# Patient Record
Sex: MALE | Race: WHITE
[De-identification: names, ages, dates, MRNs, and addresses within clinical notes are randomized per-mention and may not be internally consistent; named-entity substitution may affect disease eponyms.]

---

## 2018-01-02 ENCOUNTER — HOSPITAL ENCOUNTER (EMERGENCY)
Dept: HOSPITAL 47 - EC | Age: 63
Discharge: HOME | End: 2018-01-02
Payer: COMMERCIAL

## 2018-01-02 DIAGNOSIS — M10.9: ICD-10-CM

## 2018-01-02 DIAGNOSIS — M51.34: Primary | ICD-10-CM

## 2018-01-02 DIAGNOSIS — E07.9: ICD-10-CM

## 2018-01-02 DIAGNOSIS — E78.5: ICD-10-CM

## 2018-01-02 DIAGNOSIS — M48.061: ICD-10-CM

## 2018-01-02 DIAGNOSIS — Z79.899: ICD-10-CM

## 2018-01-02 DIAGNOSIS — M51.16: ICD-10-CM

## 2018-01-02 DIAGNOSIS — M47.814: ICD-10-CM

## 2018-01-02 DIAGNOSIS — I10: ICD-10-CM

## 2018-01-02 PROCEDURE — 99284 EMERGENCY DEPT VISIT MOD MDM: CPT

## 2018-01-02 PROCEDURE — 72131 CT LUMBAR SPINE W/O DYE: CPT

## 2018-01-02 PROCEDURE — 96372 THER/PROPH/DIAG INJ SC/IM: CPT

## 2018-01-02 PROCEDURE — 72128 CT CHEST SPINE W/O DYE: CPT

## 2018-01-02 PROCEDURE — 72192 CT PELVIS W/O DYE: CPT

## 2018-01-02 NOTE — CT
EXAMINATION TYPE: CT pelvis wo con

 

DATE OF EXAM: 1/2/2018

 

COMPARISON: NONE

 

HISTORY: Patient fell through wooden and landed on bottom, 2 weeks ago.

 

CT DLP: 1226.9 mGycm

Automated exposure control for dose reduction was used. Unenhanced CT of the pelvis was performed wit
h bone and soft tissue window settings submitted.

 

FINDINGS: 

No evidence for fracture or dislocation. Mild degenerative hip  narrowing. No evidence for soft tissu
e mass. Small fat-containing right inguinal hernia. No unusual collections or masses.

 

IMPRESSION: 

NO PELVIC FRACTURES SEEN.

## 2018-01-02 NOTE — CT
EXAMINATION TYPE: CT lumbar spine wo con

 

DATE OF EXAM: 1/2/2018

 

COMPARISON: NONE

 

HISTORY: Patient fell through wooden and landed on bottom, 2 weeks ago.

 

CT DLP: 1943.62 mGycm

 

Unenhanced CT of the lumbar spine was performed.  Bone and soft tissue window settings are submitted 
as well as coronal and sagittal reconstructions. 

 

Spinal canal is congenitally diminutive in size.

 

L1-L2: Normal disc space height.  No disc herniation protrusion or central stenosis.  No facet joint 
arthropathy.  No evidence for foraminal encroachment.  

 

L2-L3: Normal disc space height.  No disc herniation protrusion or central stenosis.  No facet joint 
arthropathy.  No evidence for foraminal encroachment.  

 

L3-L4: Mild degenerative disc space narrowing. Circumferential disc bulge effaces the ventral thecal 
sac. Mild central stenosis suggested.  

 

L4-L5: Left paracentral disc herniation resulting in left lateral recess stenosis and central stenosi
s. Left foraminal encroachment identified.  

 

L5-S1: Normal disc space height.  No disc herniation protrusion or central stenosis.  No facet joint 
arthropathy.  No evidence for foraminal encroachment.  

 

No paraspinal masses are identified.  Lumbar segments are free if fracture. Atrophic changes left kid
glenys.

 

IMPRESSION:

 

1. No evidence for fracture.

2. Left paracentral disc herniation at L4-5 resulting in central stenosis and left lateral recess dane
nosis.

3. Disc bulging with borderline to mild central stenosis at L3-4.

## 2018-01-02 NOTE — CT
EXAMINATION TYPE: CT thoracic spine wo con

 

DATE OF EXAM: 1/2/2018

 

COMPARISON: NONE

 

HISTORY: Patient fell through wooden and landed on bottom, 2 weeks ago.

 

CT DLP: 1353.6 mGycm

Automated exposure control for dose reduction was used.

 

The unenhanced CT of the thoracic spine was performed with bone and soft tissue window settings submi
tted.

 

There is no evidence for thoracic spine compression fracture. There is evidence of ventral spondylosi
s throughout. Moderate degenerative disc space narrowing is noted throughout without evidence for melinda
tral stenosis or disc herniation. No bony destructive process is seen. No paraspinal masses evident. 
Incidentally there is evidence of cardiomegaly and coronary artery calcifications.

 

 

 

IMPRESSION: 

DEGENERATIVE DISC DISEASE AND SPONDYLOSIS. NO COMPRESSION FRACTURE OF THE THORACIC SPINE OR CENTRAL S
TENOSIS.

## 2018-01-03 VITALS
TEMPERATURE: 98 F | SYSTOLIC BLOOD PRESSURE: 165 MMHG | RESPIRATION RATE: 16 BRPM | HEART RATE: 72 BPM | DIASTOLIC BLOOD PRESSURE: 80 MMHG

## 2018-06-10 ENCOUNTER — HOSPITAL ENCOUNTER (EMERGENCY)
Dept: HOSPITAL 47 - EC | Age: 63
Discharge: HOME | End: 2018-06-10
Payer: COMMERCIAL

## 2018-06-10 VITALS — HEART RATE: 62 BPM | TEMPERATURE: 98.6 F | DIASTOLIC BLOOD PRESSURE: 91 MMHG | SYSTOLIC BLOOD PRESSURE: 187 MMHG

## 2018-06-10 VITALS — RESPIRATION RATE: 18 BRPM

## 2018-06-10 DIAGNOSIS — E07.9: ICD-10-CM

## 2018-06-10 DIAGNOSIS — Z23: ICD-10-CM

## 2018-06-10 DIAGNOSIS — M10.9: ICD-10-CM

## 2018-06-10 DIAGNOSIS — Z79.82: ICD-10-CM

## 2018-06-10 DIAGNOSIS — E78.5: ICD-10-CM

## 2018-06-10 DIAGNOSIS — M54.10: ICD-10-CM

## 2018-06-10 DIAGNOSIS — Z79.899: ICD-10-CM
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
## 2018-06-10 DIAGNOSIS — S09.90XA: Primary | ICD-10-CM
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
## 2018-06-10 DIAGNOSIS — I10: ICD-10-CM

## 2018-06-10 PROCEDURE — 70450 CT HEAD/BRAIN W/O DYE: CPT

## 2018-06-10 PROCEDURE — 96372 THER/PROPH/DIAG INJ SC/IM: CPT

## 2018-06-10 PROCEDURE — 90715 TDAP VACCINE 7 YRS/> IM: CPT

## 2018-06-10 PROCEDURE — 72125 CT NECK SPINE W/O DYE: CPT

## 2018-06-10 PROCEDURE — 90471 IMMUNIZATION ADMIN: CPT

## 2018-06-10 PROCEDURE — 99284 EMERGENCY DEPT VISIT MOD MDM: CPT

## 2018-06-10 PROCEDURE — 73030 X-RAY EXAM OF SHOULDER: CPT

## 2018-06-10 NOTE — XR
EXAMINATION TYPE: XR shoulder complete LT

 

DATE OF EXAM: 6/10/2018

 

COMPARISON: NONE

 

HISTORY: Pain

 

TECHNIQUE: Three views are submitted.

 

FINDINGS:

The osseous structures are intact.  There is no acute fracture or dislocation. Arthropathy of the AC 
joint. IMPRESSION:

1. No acute process.

## 2018-06-10 NOTE — CT
EXAMINATION TYPE: CT brain cspine wo con

 

DATE OF EXAM: 6/10/2018

 

COMPARISON: NONE

 

HISTORY: Fall 1 week ago.  Facial and frontal bruising

 

CT DLP: 1831.4 mGycm

Automated exposure control for dose reduction was used.

 

TECHNIQUE: CT scan of the head and cervical spine are performed without contrast.

 

FINDINGS:   Nasal septal deviation noted. Soft tissue hematoma overlying the frontal bone mild genera
lized degenerative change. Faint low-attenuation within the white matter is nonspecific but suggestiv
e of remote microvascular.

Calvarium intact..

There is a 1 cm soft tissue protuberance extending off the posterior nasopharynx. Recommend ENT consu
ltation. Mass not excluded.

 

Multilevel degenerative disc disease and facet arthropathy with posterior spondylosis. There is multi
level foraminal encroachment extending from level C3-T1. No compression deformities. Suspect canal st
enosis at C5-C6 and C4-C5. Could not exclude disc herniations due to the limitations exam. Central st
enosis at C6-C7 also suspected atherosclerotic change of the carotid systems is noted. Tip of odontoi
d demonstrate a corticated density which appears chronic. May be related to previous trauma or post a
rthropathy.

 

IMPRESSION:

1. There is no acute fracture or dislocation evident in the cervical spine.

2. No acute intracranial hemorrhage, mass effect, or midline shift is seen. There is evidence of a chong
bcutaneous soft tissue hematoma overlying the frontal bone.

3. Multilevel degenerative disc disease with suspected multilevel canal stenosis. Recommend follow-up
 MRI.

4. Within the posterior nasopharynx there is a soft tissue nodule measuring 1 cm correlate for mucosa
l lesion.

## 2018-06-10 NOTE — ED
Fall HPI





- General


Chief Complaint: Fall


Stated Complaint: Fall


Time Seen by Provider: 06/10/18 12:21


Source: patient


Mode of arrival: wheelchair





- History of Present Illness


Initial Comments: 


63 years old male fell 6 days ago he bumped his head against a hard surface no 

complaining about 10 headache and neck pain he is complaining about numbness in 

his both arms, his strength in his both arms is fine also complaining about the 

left shoulder pain and range of motion is compromised and now he stated that he 

is having a hard time raising the left arm above his head.  Denies any neck 

stiffness no chest pain or shortness of breath no abdominal pain no symptoms of 

TIA or CVA








- Related Data


 Home Medications











 Medication  Instructions  Recorded  Confirmed


 


Levothyroxine Sodium [Synthroid] 75 mcg PO DAILY 05/07/14 06/10/18


 


Pravastatin Sodium [Pravachol] 40 mg PO DAILY 05/07/14 06/10/18


 


Allopurinol [Zyloprim] 300 mg PO DAILY 05/12/14 06/10/18


 


Aspirin 81 mg PO DAILY 01/02/18 06/10/18


 


Furosemide [Lasix] 40 mg PO DAILY 01/02/18 06/10/18


 


Losartan/Hydrochlorothiazide 1 tab PO DAILY 01/02/18 06/10/18





[Losartan-Hctz 100-25 mg Tab]   


 


Metoprolol Succinate (ER) [Toprol 50 mg PO DAILY 01/02/18 06/10/18





Xl]   


 


amLODIPine [Norvasc] 10 mg PO DAILY 01/02/18 06/10/18


 


Ibuprofen 800 mg PO TID PRN 06/10/18 06/10/18


 


Potassium Chloride [Klor-Con 20] 20 meq PO DAILY 06/10/18 06/10/18








 Previous Rx's











 Medication  Instructions  Recorded


 


Gabapentin [Neurontin] 100 mg PO DAILY #30 capsule 06/10/18











 Allergies











Allergy/AdvReac Type Severity Reaction Status Date / Time


 


No Known Allergies Allergy   Verified 06/10/18 12:10














Review of Systems


ROS Statement: 


Those systems with pertinent positive or pertinent negative responses have been 

documented in the HPI.





ROS Other: All systems not noted in ROS Statement are negative.





Past Medical History


Past Medical History: Diabetes Mellitus, Hyperlipidemia, Hypertension, Thyroid 

Disorder


Additional Past Medical History / Comment(s): gout


History of Any Multi-Drug Resistant Organisms: None Reported


Past Surgical History: No Surgical Hx Reported


Additional Past Surgical History / Comment(s): REMOVAL MED CATARACTS


Past Anesthesia/Blood Transfusion Reactions: No Reported Reaction


Past Psychological History: No Psychological Hx Reported


Past Alcohol Use History: None Reported





General Exam


Limitations: no limitations





Course





 Vital Signs











  06/10/18 06/10/18 06/10/18





  11:44 12:54 13:59


 


Temperature 98.3 F  


 


Pulse Rate 79  77


 


Respiratory 22  18





Rate   


 


Blood Pressure 181/102  195/91


 


Blood Pressure  204/99 





[Left Arm   





Sitting]   


 


Blood Pressure  203/112 





[Left Arm   





Standing]   


 


Blood Pressure  207/95 





[Left Arm   





Supine]   


 


O2 Sat by Pulse 97  100





Oximetry   














  06/10/18





  14:31


 


Temperature 


 


Pulse Rate 69


 


Respiratory 18





Rate 


 


Blood Pressure 180/86


 


Blood Pressure 





[Left Arm 





Sitting] 


 


Blood Pressure 





[Left Arm 





Standing] 


 


Blood Pressure 





[Left Arm 





Supine] 


 


O2 Sat by Pulse 99





Oximetry 














Critical Care Time


Total Critical Care Time: 30


Critical Care Time: 





Patient came in is that pressure was it was 70 systolic he hasn't had his blood 

pressure medications for last 2-3 days we gave him on Norvasc 10 mg which is 

his home medication we also had a beta blockers 50 mg by mouth and then gave 

him a hydralazine 20 mg intramuscular and we watched him for about an hour his 

blood pressure is 170 systolic now.  He is advised to follow-up with his family 

doctor about blood pressure is advised to keep a log for blood pressure once a 

day for the next 10 days he agrees with the period as far as numbness in his 

both hands concern I believe this is from spinal stenosis of the cervical spine 

I am going to start him on a gabapentin 100 mg by mouth daily at bedtime which 

should be increased by the primary care doctor over the weeks to maybe 300 mg 3 

times a day





Disposition


Clinical Impression: 


 Head injury, Radiculopathy





Disposition: HOME SELF-CARE


Condition: Good


Instructions:  Fall Prevention for Older Adults (ED)


Prescriptions: 


Gabapentin [Neurontin] 100 mg PO DAILY #30 capsule


Is patient prescribed a controlled substance at d/c from ED?: No


Referrals: 


Graeme Umaña MD [Primary Care Provider] - 1-2 days

## 2018-07-10 ENCOUNTER — HOSPITAL ENCOUNTER (OUTPATIENT)
Dept: HOSPITAL 47 - RADMRIMAIN | Age: 63
Discharge: HOME | End: 2018-07-10
Attending: PHYSICAL MEDICINE & REHABILITATION
Payer: MEDICARE

## 2018-07-10 DIAGNOSIS — Z53.9: Primary | ICD-10-CM

## 2019-04-01 ENCOUNTER — HOSPITAL ENCOUNTER (INPATIENT)
Dept: HOSPITAL 47 - EC | Age: 64
LOS: 4 days | Discharge: NURSING, REHAB OR LONG-TERM CARE | DRG: 493 | End: 2019-04-05
Attending: ORTHOPAEDIC SURGERY | Admitting: ORTHOPAEDIC SURGERY
Payer: MEDICARE

## 2019-04-01 DIAGNOSIS — Z79.890: ICD-10-CM

## 2019-04-01 DIAGNOSIS — S93.421A: ICD-10-CM

## 2019-04-01 DIAGNOSIS — E87.6: ICD-10-CM

## 2019-04-01 DIAGNOSIS — G47.30: ICD-10-CM

## 2019-04-01 DIAGNOSIS — G89.29: ICD-10-CM

## 2019-04-01 DIAGNOSIS — E03.9: ICD-10-CM

## 2019-04-01 DIAGNOSIS — Z82.49: ICD-10-CM
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
## 2019-04-01 DIAGNOSIS — E11.9: ICD-10-CM

## 2019-04-01 DIAGNOSIS — Z79.899: ICD-10-CM

## 2019-04-01 DIAGNOSIS — Z98.41: ICD-10-CM

## 2019-04-01 DIAGNOSIS — F10.10: ICD-10-CM

## 2019-04-01 DIAGNOSIS — S82.61XA: Primary | ICD-10-CM
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
## 2019-04-01 DIAGNOSIS — F19.10: ICD-10-CM

## 2019-04-01 DIAGNOSIS — L03.116: ICD-10-CM

## 2019-04-01 DIAGNOSIS — M54.9: ICD-10-CM

## 2019-04-01 DIAGNOSIS — E78.5: ICD-10-CM

## 2019-04-01 DIAGNOSIS — F17.200: ICD-10-CM

## 2019-04-01 DIAGNOSIS — I10: ICD-10-CM

## 2019-04-01 DIAGNOSIS — M10.9: ICD-10-CM

## 2019-04-01 DIAGNOSIS — E83.51: ICD-10-CM

## 2019-04-01 DIAGNOSIS — M19.90: ICD-10-CM

## 2019-04-01 DIAGNOSIS — Z98.42: ICD-10-CM

## 2019-04-01 DIAGNOSIS — Z83.3: ICD-10-CM

## 2019-04-01 DIAGNOSIS — E66.01: ICD-10-CM

## 2019-04-01 DIAGNOSIS — R26.9: ICD-10-CM

## 2019-04-01 LAB
ALBUMIN SERPL-MCNC: 3.8 G/DL (ref 3.5–5)
ALP SERPL-CCNC: 85 U/L (ref 38–126)
ALT SERPL-CCNC: 33 U/L (ref 21–72)
ANION GAP SERPL CALC-SCNC: 10 MMOL/L
AST SERPL-CCNC: 26 U/L (ref 17–59)
BASOPHILS # BLD AUTO: 0.1 K/UL (ref 0–0.2)
BASOPHILS NFR BLD AUTO: 1 %
BUN SERPL-SCNC: 28 MG/DL (ref 9–20)
CALCIUM SPEC-MCNC: 9.5 MG/DL (ref 8.4–10.2)
CHLORIDE SERPL-SCNC: 107 MMOL/L (ref 98–107)
CO2 SERPL-SCNC: 24 MMOL/L (ref 22–30)
EOSINOPHIL # BLD AUTO: 0.4 K/UL (ref 0–0.7)
EOSINOPHIL NFR BLD AUTO: 3 %
ERYTHROCYTE [DISTWIDTH] IN BLOOD BY AUTOMATED COUNT: 4.25 M/UL (ref 4.3–5.9)
ERYTHROCYTE [DISTWIDTH] IN BLOOD: 14.3 % (ref 11.5–15.5)
GLUCOSE SERPL-MCNC: 105 MG/DL (ref 74–99)
HCT VFR BLD AUTO: 40.4 % (ref 39–53)
HGB BLD-MCNC: 14 GM/DL (ref 13–17.5)
LYMPHOCYTES # SPEC AUTO: 1.4 K/UL (ref 1–4.8)
LYMPHOCYTES NFR SPEC AUTO: 11 %
MCH RBC QN AUTO: 32.9 PG (ref 25–35)
MCHC RBC AUTO-ENTMCNC: 34.6 G/DL (ref 31–37)
MCV RBC AUTO: 95 FL (ref 80–100)
MONOCYTES # BLD AUTO: 0.8 K/UL (ref 0–1)
MONOCYTES NFR BLD AUTO: 6 %
NEUTROPHILS # BLD AUTO: 9.9 K/UL (ref 1.3–7.7)
NEUTROPHILS NFR BLD AUTO: 78 %
PLATELET # BLD AUTO: 236 K/UL (ref 150–450)
POTASSIUM SERPL-SCNC: 3.3 MMOL/L (ref 3.5–5.1)
PROT SERPL-MCNC: 6.5 G/DL (ref 6.3–8.2)
SODIUM SERPL-SCNC: 141 MMOL/L (ref 137–145)
WBC # BLD AUTO: 12.7 K/UL (ref 3.8–10.6)

## 2019-04-01 PROCEDURE — 80053 COMPREHEN METABOLIC PANEL: CPT

## 2019-04-01 PROCEDURE — 93005 ELECTROCARDIOGRAM TRACING: CPT

## 2019-04-01 PROCEDURE — 0QSJXZZ REPOSITION RIGHT FIBULA, EXTERNAL APPROACH: ICD-10-PCS

## 2019-04-01 PROCEDURE — 83880 ASSAY OF NATRIURETIC PEPTIDE: CPT

## 2019-04-01 PROCEDURE — 51702 INSERT TEMP BLADDER CATH: CPT

## 2019-04-01 PROCEDURE — 96374 THER/PROPH/DIAG INJ IV PUSH: CPT

## 2019-04-01 PROCEDURE — 71045 X-RAY EXAM CHEST 1 VIEW: CPT

## 2019-04-01 PROCEDURE — 85025 COMPLETE CBC W/AUTO DIFF WBC: CPT

## 2019-04-01 PROCEDURE — 96361 HYDRATE IV INFUSION ADD-ON: CPT

## 2019-04-01 PROCEDURE — 27788 TREATMENT OF ANKLE FRACTURE: CPT

## 2019-04-01 PROCEDURE — 82306 VITAMIN D 25 HYDROXY: CPT

## 2019-04-01 PROCEDURE — 99285 EMERGENCY DEPT VISIT HI MDM: CPT

## 2019-04-01 RX ADMIN — HEPARIN SODIUM SCH: 5000 INJECTION, SOLUTION INTRAVENOUS; SUBCUTANEOUS at 21:33

## 2019-04-01 RX ADMIN — CEFAZOLIN SCH MLS/HR: 330 INJECTION, POWDER, FOR SOLUTION INTRAMUSCULAR; INTRAVENOUS at 20:08

## 2019-04-01 NOTE — CONS
CONSULTATION



DATE OF SERVICE:

04/01/2019.



REASON FOR CONSULTATION:

Advice regarding sleep apnea and gout,  diabetes mellitus,  requested by Dr. Chavez.



HISTORY OF PRESENT ILLNESS:

This 63-year-old gentleman with a past medical history hypertension, diabetes,

hyperlipidemia, history of hypothyroidism,  has got back problems, DJD being 
followed

by primary physician in the outpatient setting, apparently complaining of right 
ankle

pain.  The patient apparently fell and had twisting of the left ankle inwards.  
The

patient was found to have ankle fracture and a cast was applied by Dr. Chavez 
in the

ER.  The ankle was found to be swollen and Surgery is deferred at this point at 
this

time.  There is no history of fever, rigors or chills. No history of headache,  
loss of

consciousness, seizures.  Previous excess capacity appears to be adequate at 
this time.

There is no history of chest pain, palpitation, history of myocardial 
infarction.



PAST MEDICAL HISTORY:

History of diabetes, hypertension, hyperlipidemia, hypothyroidism, history of 
gout,

back problems, and sleep apnea.



MEDICATIONS:

Prior to admission include:

1. Norvasc 5 mg p.o. daily.

2. Toprol-XL 50 mg.

3. Norvasc 10 mg p.o. daily.

4. Pravachol 40 mg.

5. Losartan hydrochlorothiazide 1 p.o. daily.

6. Synthroid 75 mcg.

7. Zyloprim 300 mg p.o. daily.



ALLERGIES:

None.



FAMILY HISTORY:

No history of heart disease or strokes in the family.



SOCIAL HISTORY:

Occasional alcohol.  History of continued ongoing nicotine dependence.  Remote 
history

of recreational drug abuse prior to 1984.



REVIEW OF SYSTEMS:

ENT: No diminished hearing or diminished vision.

CARDIOVASCULAR: No angina. No palpitations.

RESPIRATORY: As mentioned earlier.

GI no nausea or vomiting.

: No dysuria.

NERVOUS SYSTEM: No numbness or weakness.

ALLERGY/IMMUNOLOGY: No asthma or hayfever.

MUSCULOSKELETAL:  As mentioned earlier.

HEMATOLOGY/ONCOLOGY: No history of anemia.

ENDOCRINE: Hypothyroidism.

CONSTITUTIONAL:  As mentioned early.

Dermatology: Negative.  Rheumatology: Negative. Psychiatry: As mentioned 
earlier.



PHYSICAL EXAMINATION:

Alert and oriented times three.  Pulse is 73, blood pressure 188/91, respiration
rate

18, temperature is normal.  Pulse ox 96% on 15% non-rebreather.

HEENT: Conjunctivae normal.  Oral mucosa moist.

NECK: Neck is obese.  No jugular venous distention.  No carotid bruit. No lymph 
node

enlargement. CARDIOVASCULAR:  S1, S2 muffled.

RESPIRATIONS: Breath sounds diminished in the bases. A few scattered rhonchi.  
No

crackles.

ABDOMEN:  Soft, obese, nontender.  No mass palpable.

LEGS:  Bilateral leg edema and as well as right ankle fracture also present.

NERVOUS SYSTEM: Higher functions as mentioned earlier.  Moves all 4 limbs.  No 
focal

motor deficits.

Lymphatics: No lymph nodes palpable in the neck, axillae or groin.

Skin:  No ulcer, rash, bleeding.

JOINTS: No active deforming arthropathy.



LAB STUDIES:

WBC 12.2, hemoglobin 14, otherwise glucose 105, total bilirubin is 1.6, and the 
chest x-

ray done now showed a portable chest x-ray showed EKG, nonspecific ST-T changes 
showed

no evidence of fluid fluid overload.



ASSESSMENT:

1. Status post right ankle fracture.

2. possibly  sleep apnea.

3. Super morbid obesity,  BMI of 45.6, increased WBC.

4. Hypokalemia, mild.

5. Diabetes mellitus type 2.

6. Hypertension.

7. Hyperlipidemia.

8. Hypothyroidism.

9. Gout.

10.Degenerative joint disease.

11.Back problems.

12.History of nicotine dependence.

13.History of ETOH.



RECOMMENDATIONS AND DISCUSSION:

In this 63-year-old gentleman who presented with multiple complex medical 
issues, at

this time I recommend to continue current medications, management and 
symptomatic

treatment.  Resume the home medications.  Monitor blood sugars closely.  I would

recommend continue with the CPAP at nighttime and continue to monitor.  I would 
also

recommend an ABG to rule out possible hypercarbia.  Otherwise, the patient also 
had

history of EtOH.  Patient apparently drinks about 14 drinks per week or so.  
Also

recommend CIWA protocol and watching for  dt precautions.  Smoking cessation has
been

advised.  We will follow the patient closely with you and further 
recommendations to

follow.



Thank you Dr. Chavez for letting us participate in the care of this patient.





MMDONNIEL / AYANAN: [id number] / Job#: [id number]

SEVERINO

## 2019-04-01 NOTE — P.HPOR
History of Present Illness


H&P Date: 04/01/19





The patient is a very pleasant 63-year-old male who lives alone and presents 

with a right ankle fracture-dislocation. He fell last Tuesday injuring his 

ankle. It was painful at that time, but he didn't seek medical attention. He was

initially able to walk, but then had increased pain and he started crawling on 

his hands and knees developing sores on his feet. He re-injured his ankle today 

and felt a "pop" and had increased pain. He was brought into the ER. An attempt 

was made at closed reduction and splinting, but following application of the 

splint the ankle was still found to be significantly displaced. Orthopaedics was

consulted to assist with reduction. At the time of evaluation he is complaining 

of isolated ankle pain. He says he has chronic swelling in both ankles. 





Past Medical History


Past Medical History: Diabetes Mellitus, Hyperlipidemia, Hypertension, Thyroid 

Disorder


Additional Past Medical History / Comment(s): gout, back problems


History of Any Multi-Drug Resistant Organisms: None Reported


Past Surgical History: No Surgical Hx Reported


Additional Past Surgical History / Comment(s): REMOVAL MED CATARACTS


Past Anesthesia/Blood Transfusion Reactions: No Reported Reaction


Past Psychological History: No Psychological Hx Reported


Smoking Status: Current some day smoker


Past Alcohol Use History: None Reported


Additional Past Alcohol Use History / Comment(s): 1-2 PINTS PER WEEK AVG EST, 

APPROX 14 DRINKS PER WK STATED ALSO


Past Drug Use History: None Reported


Additional Drug Use History / Comment(s): OLD HX OF RECREATIONAL DRUG USE 

REPORTED, PRIOR TO 1984; NONE NOW





- Past Family History


  ** Mother


Family Medical History: Coronary Artery Disease (CAD), Diabetes Mellitus, 

Hypertension, Rheumatoid Arthritis (RA)





  ** Father


Family Medical History: Congestive Heart Failure (CHF)





Medications and Allergies


                                Home Medications











 Medication  Instructions  Recorded  Confirmed  Type


 


Levothyroxine Sodium [Synthroid] 75 mcg PO DAILY 05/07/14 04/01/19 History


 


Pravastatin Sodium [Pravachol] 40 mg PO DAILY 05/07/14 04/01/19 History


 


Allopurinol [Zyloprim] 300 mg PO DAILY 05/12/14 04/01/19 History


 


Losartan/Hydrochlorothiazide 1 tab PO DAILY 01/02/18 04/01/19 History





[Losartan-Hctz 100-25 mg Tab]    


 


Metoprolol Succinate (ER) [Toprol 50 mg PO DAILY 01/02/18 04/01/19 History





Xl]    


 


amLODIPine [Norvasc] 10 mg PO DAILY 01/02/18 04/01/19 History


 


amLODIPine [Norvasc] 5 mg PO DAILY 04/01/19 04/01/19 History








                                    Allergies











Allergy/AdvReac Type Severity Reaction Status Date / Time


 


No Known Allergies Allergy   Verified 04/01/19 15:39














Physical Examination





The patient is alert and able to answer questions. He is obese. His head is 

NC/AT. He has poor dentition with multiple visual plaques. He demonstrates non-

labored breathing with symmetric chest expansion. On inspection of both legs 

there is diffuse swelling and chronic lichenfication of the skin. There is an ob

vious deformity of the right ankle, but no open wounds. Motor and sensory 

function is intact. The dorsalis pedis pulse is palpable on the right foot. 





Results





X-rays of the right ankle show a bimalleolar equivalent ankle fracture-

dislocation. 





- Labs


Labs: 


                  Abnormal Lab Results - Last 24 Hours (Table)











  04/01/19 04/01/19 Range/Units





  19:50 19:50 


 


WBC  12.7 H   (3.8-10.6)  k/uL


 


RBC  4.25 L   (4.30-5.90)  m/uL


 


Neutrophils #  9.9 H   (1.3-7.7)  k/uL


 


Potassium   3.3 L  (3.5-5.1)  mmol/L


 


BUN   28 H  (9-20)  mg/dL


 


Glucose   105 H  (74-99)  mg/dL


 


Total Bilirubin   1.6 H  (0.2-1.3)  mg/dL








                                      H & H











  04/01/19 Range/Units





  19:50 


 


Hgb  14.0  (13.0-17.5)  gm/dL


 


Hct  40.4  (39.0-53.0)  %











Result Diagrams: 


                                 04/01/19 19:50





                                 04/01/19 19:50





Assessment and Plan


(1) Dislocation of ankle, right, closed


Current Visit: Yes   Status: Acute   Code(s): S93.04XA - DISLOCATION OF RIGHT 

ANKLE JOINT, INITIAL ENCOUNTER   SNOMED Code(s): 287246851


   





(2) Right fibular fracture


Current Visit: Yes   Status: Acute   Code(s): S82.401A - UNSP FRACTURE OF SHAFT 

OF RIGHT FIBULA, INIT FOR CLOS FX   SNOMED Code(s): 77724139


   


Plan: 





Since the patient lives alone and will likely have a difficult time taking care 

of himself due to his fracture, weight bearing restriction and social situation 

he is going to be admitted for likely placement in a rehab facility or nursing 

home. His fracture will ultimately require surgical stabilization. We will re-

assess his soft tissue swelling tomorrow morning. If his soft-tissue envelope 

appears amenable we will proceed with an ORIF. If there is too much swelling we 

will delay surgery for 1-2 weeks to allow resolution of soft tissue swelling. 

Internal medicine has been consulted for pre-operative clearance. 





Procedure: Verbal consent was obtained for closed reduction and splinting of the

 ankle. A hematoma/ankle block was performed using 2% lidocaine. The medial 

portal was used to inject the lidocaine. The ankle was reduced with the Marissa 

maneuver. A well-padded bulky [place] splint was applied with varus mold. The 

patient tolerated the procedure well. 


Time with Patient: Greater than 30

## 2019-04-01 NOTE — ED
Lower Extremity Injury HPI





- General


Chief Complaint: Extremity Injury, Lower


Stated Complaint: Ankle pain/fall


Time Seen by Provider: 04/01/19 15:22


Source: patient, EMS


Mode of arrival: EMS


Limitations: no limitations





- History of Present Illness


Initial Comments: 


The 63-year-old male with past medical history of hypertension, gout and chronic

back pain presenting today for chief complaint of right ankle pain and inability

to weight-bear.  Patient states on Tuesday of last week he fell twisting his 

ankle inward.  He states he thought he had a sprain, he states immediately after

he had pain with weightbearing, he states that the days progressed it improved, 

he states he was using his walker which uses for chronic back pain at times, 

then he began using a cane, he states for the past 2 days has been ambulatory 

without difficulty only mild discomfort.  However today when he went to let the 

dog out he noticed a pop in his right ankle and felt as though gave out, he 

states he was not able to weight-bear secondary to the pain.  Patient denies f

all that time.  He states he sat down, EMS was called due to inability to 

weight-bear and he is brought to the emergency department for evaluation.  

Patient states the ankle has been bruised for the past week has had soft tissue 

swelling.  Denies any numbness tingling or loss sensation coolness or pallor of 

the extremity.  Patient states he had mild pain at the knee this began last 

Tuesday however has been improving over the course of the week.  Remaining 

review of systems negative patient denies head injury loss of consciousness, 

abdominal trauma, back pain, fever, chills, shortness of breath, chest pain, 

abdominal pain, nausea or vomiting, numbness or tingling, dysuria or hematuria, 

constipation or diarrhea, headaches or visual changes, or any other complaints. 








- Related Data


                                Home Medications











 Medication  Instructions  Recorded  Confirmed


 


Levothyroxine Sodium [Synthroid] 75 mcg PO DAILY 05/07/14 04/01/19


 


Pravastatin Sodium [Pravachol] 40 mg PO DAILY 05/07/14 04/01/19


 


Allopurinol [Zyloprim] 300 mg PO DAILY 05/12/14 04/01/19


 


Losartan/Hydrochlorothiazide 1 tab PO DAILY 01/02/18 04/01/19





[Losartan-Hctz 100-25 mg Tab]   


 


Metoprolol Succinate (ER) [Toprol 50 mg PO DAILY 01/02/18 04/01/19





Xl]   


 


amLODIPine [Norvasc] 10 mg PO DAILY 01/02/18 04/01/19


 


amLODIPine [Norvasc] 5 mg PO DAILY 04/01/19 04/01/19











                                    Allergies











Allergy/AdvReac Type Severity Reaction Status Date / Time


 


No Known Allergies Allergy   Verified 04/01/19 15:39














Review of Systems


ROS Statement: 


Those systems with pertinent positive or pertinent negative responses have been 

documented in the HPI.





ROS Other: All systems not noted in ROS Statement are negative.





Past Medical History


Past Medical History: Diabetes Mellitus, Hyperlipidemia, Hypertension, Thyroid 

Disorder


Additional Past Medical History / Comment(s): gout, back problems


History of Any Multi-Drug Resistant Organisms: None Reported


Past Surgical History: No Surgical Hx Reported


Additional Past Surgical History / Comment(s): REMOVAL MED CATARACTS


Past Anesthesia/Blood Transfusion Reactions: No Reported Reaction


Past Psychological History: No Psychological Hx Reported


Smoking Status: Current some day smoker


Past Alcohol Use History: None Reported


Past Drug Use History: None Reported





General Exam





- General Exam Comments


Initial Comments: 


General:  The patient is awake and alert, in no distress, and does not appear 

acutely ill. Morbidly obese


Eye:  +3 mm pupils are equal, round and reactive to light, extra-ocular 

movements are intact.  No nystagmus.  There is normal conjunctiva bilaterally.  

No signs of icterus.  


Ears, nose, mouth and throat:  There are moist mucous membranes and no oral 

lesions.  No raccoon or Garcia sign.


Neck:  The neck is supple, there is no tenderness or JVD.  


Cardiovascular:  There is a regular rate and rhythm. No murmur, rub or gallop is

 appreciated.


Respiratory:  Lungs are clear to auscultation, respirations are non-labored, 

breath sounds are equal.  No wheezes, stridor, rales, or rhonchi.


Gastrointestinal:  Soft, non-distended, non-tender abdomen without masses or 

organomegaly noted. There is no rebound or guarding present.  No CVA tenderness.

 Bowel sounds are unremarkable.


Musculoskeletal:  Upon inspection of the right ankle there is gross deformity.  

There is ecchymosis noted and soft tissue swelling this appears moderate in 

comparison with the left ankle.  Patient is unable to range secondary to pain 

and gross deformity.  Patient denies any loss of sensation distal or proximal to

 injury site.  Patient has mild tenderness to patient of the right anterior 

knee.  Normal ROM, no tenderness of the left LE.  Strength 5/5 at knees b/l, 

extensor mechanism intact. Sensation intact of the of the LE b/l inclding 

proximal and distal to affected extremity . DP pulses equal bilaterally strong 

2+.  


Neurological:  A&O x 3. CN II-XII intact, There are no obvious motor or sensory 

deficits. Coordination appears grossly intact. Speech is normal.


Skin:  Skin is warm and dry and no rashes or lesions are noted. 


Psychiatric:  Cooperative, appropriate mood & affect, normal judgment.  





Limitations: no limitations





Course


                                   Vital Signs











  04/01/19 04/01/19 04/01/19





  15:15 17:07 17:11


 


Temperature 97.4 F L  


 


Pulse Rate 76 78 67


 


Respiratory 18 20 20





Rate   


 


Blood Pressure 189/97 191/106 172/71


 


O2 Sat by Pulse 99 100 100





Oximetry   














  04/01/19 04/01/19 04/01/19





  17:16 17:21 20:09


 


Temperature   


 


Pulse Rate 68 69 73


 


Respiratory 20 20 18





Rate   


 


Blood Pressure 135/69 146/90 180/91


 


O2 Sat by Pulse 100 100 98





Oximetry   














- Reevaluation(s)


Reevaluation #1: 


Spoke with Physician Assistant Jo Fortune about case, she reviewed imaging 

studies, we discussed history. She spoke with attending Dr Adrian who 

recommend reduction/splint. He is agreeable with admission for subacute 

placement until surgical intervention.


04/01/19 16:40











Reevaluation #2: 


Pt states he can not pee in urinal, or weight bear in right or left leg. 

Requesting catherization to prevent incontinence. Cortez placed.











Medical Decision Making





- Medical Decision Making


63-year-old male presented for right ankle deformity and inability to weight-

bear.  Patient fell last week twisting right ankle.  Patient states today after 

being able to weight-bear for the past week he noted a loud pop, pain in the 

right ankle and inability to weight-bear.  At this time patient called EMS.  

X-rays revealed a fibula fracture 100% displaced medially with dislocation of 

the ankle mortise.  No obvious osseous injury of the medial malleolus or 

posteriorly.  Patient neurovascularly intact.  Conscious sedation was performed 

by Dr. Dolan attending provider.  He reviewed imaging studies.  Patient t

olerated procedure well, procedures performed using propofol after written 

consent.  Repeat neurovascular exam after reduction and splint placement was 

within normal limits. Ortho consulted who reviewed imaging studies, recommend 

reduction/splint. Pt states he has significant left arthritis and with the right

 ankle fracture and would not be able to ambulate.  At this time Dr. Chavez 

accepted admission for social work consult/rehab placement until operative 

measures performed. 1st set post reduction films revealed reduction, second set 

after splint was in place, revealed dislocation and displacement. Pt NV intact, 

repeat reduction performed. Dr. Chavez was paged for the second time, he came 

to patient bedside and reduced ankle and placed in bulky rees. Pt NV intact 

after splinting transferred to floor in stable condition. Dr. Weiner presented at

 bedside for medical clearance.








- Lab Data


Result diagrams: 


                                 04/01/19 19:50





                                 04/01/19 19:50





- EKG Data


EKG Comments: 


Ventricular rate 70 bpm, MT interval 170 Chris, to administration needs six-

month second, QT/QTc 528/570 no seconds.  Sinus rhythm with occasional PVC.  

Nonspecific T-wave.  No ST elevation or depression.








Disposition


Clinical Impression: 


 Inability to bear weight, Right fibular fracture, Dislocation of ankle, right, 

closed, History of fall





Disposition: ADMITTED AS IP TO THIS HOSP


Condition: Stable


Is patient prescribed a controlled substance at d/c from ED?: No


Time of Disposition: 17:33


Decision to Admit Reason: Admit from EC


Decision Date: 04/01/19


Decision Time: 17:33

## 2019-04-01 NOTE — XR
Right foot, right ankle, right knee

 

HISTORY: Pain and swelling, trauma and pain

 

3 views of the right foot, 3 views of the right ankle, 3 views of the right knee submitted.

 

There is a fracture dislocation at the ankle, bayonet apposition present at the distal fibular fractu
re which is displaced laterally. Ankle is dislocated laterally approximately 2.4 cm. Small ossific fr
agments are present laterally compatible with comminution.

 

Plantar calcaneal spur noted incidentally. There is soft tissue swelling. Enthesophyte present at the
 insertion of the Achilles tendon. Degenerative changes are present at the first metatarsal nodule kurt
int. Degenerative changes are present at the intertarsal joints. Knee joint shows normal alignment an
d bone mineralization. There is marginal spurring in the medial compartment with joint space loss com
patible with osteoarthritic change.

 

IMPRESSION: Ankle fracture dislocation as described.

## 2019-04-01 NOTE — XR
EXAMINATION TYPE: XR ankle limited RT

 

DATE OF EXAM: 4/1/2019

 

CLINICAL HISTORY: Right ankle fracture.

 

TECHNIQUE: Single frontal view of the right ankle is obtained.

 

COMPARISON: Right ankle x-ray earlier today.

 

FINDINGS:  There is interval improvement in alignment after reduction with slight lateral tilting of 
the ankle mortise and medial space widening. There is interval improvement in comminuted fracture thr
ough the lateral malleolus is still slight lateral step-off measured 6 to 7 mm. Moderate diffuse subc
utaneous edema soft tissue swelling medially and laterally remains present.

 

IMPRESSION:  There is improved alignment on frontal view after reduction.

## 2019-04-01 NOTE — XR
EXAMINATION TYPE: XR ankle limited RT

 

DATE OF EXAM: 4/1/2019

 

CLINICAL HISTORY: Post reduction and splinting.

 

TECHNIQUE: Frontal and lateral images of the right ankle are obtained.

 

COMPARISON: Right ankle x-rays earlier today..

 

FINDINGS:  There is interval placement of splint cast material which is noted low radiographic sensit
ivity. Comminuted displaced fracture through lateral malleolus shows increased lateral displacement a
nd angulation. Posterior displacement is noted on lateral view. There is new lateral tilting and subl
uxation of talus relative to distal tibia.

 

IMPRESSION:  There is worsening alignment after casting.

## 2019-04-01 NOTE — XR
EXAMINATION TYPE: XR chest 1V portable

 

DATE OF EXAM: 4/1/2019

 

CLINICAL HISTORY: Difficulty breathing and CHF.  

 

TECHNIQUE: Single AP portable upright view of the chest is obtained.

 

COMPARISON: None.

 

FINDINGS:  Exam suboptimal due to portable technique and patient's large body habitus. Cardiomegaly i
s present with atelectatic thoracic aorta. There is no suspicious focal airspace opacity, pleural eff
usion, or pneumothorax seen bilaterally. Osseous structures are intact.

 

IMPRESSION: Cardiomegaly without acute pulmonary process.

## 2019-04-02 LAB
ALBUMIN SERPL-MCNC: 3.7 G/DL (ref 3.5–5)
ALP SERPL-CCNC: 78 U/L (ref 38–126)
ALT SERPL-CCNC: 33 U/L (ref 21–72)
ANION GAP SERPL CALC-SCNC: 9 MMOL/L
AST SERPL-CCNC: 27 U/L (ref 17–59)
BASOPHILS # BLD AUTO: 0.1 K/UL (ref 0–0.2)
BASOPHILS # BLD AUTO: 0.1 K/UL (ref 0–0.2)
BASOPHILS NFR BLD AUTO: 1 %
BASOPHILS NFR BLD AUTO: 1 %
BUN SERPL-SCNC: 23 MG/DL (ref 9–20)
CALCIUM SPEC-MCNC: 8.8 MG/DL (ref 8.4–10.2)
CHLORIDE SERPL-SCNC: 108 MMOL/L (ref 98–107)
CO2 SERPL-SCNC: 24 MMOL/L (ref 22–30)
EOSINOPHIL # BLD AUTO: 0.1 K/UL (ref 0–0.7)
EOSINOPHIL # BLD AUTO: 0.3 K/UL (ref 0–0.7)
EOSINOPHIL NFR BLD AUTO: 1 %
EOSINOPHIL NFR BLD AUTO: 3 %
ERYTHROCYTE [DISTWIDTH] IN BLOOD BY AUTOMATED COUNT: 4.2 M/UL (ref 4.3–5.9)
ERYTHROCYTE [DISTWIDTH] IN BLOOD BY AUTOMATED COUNT: 4.41 M/UL (ref 4.3–5.9)
ERYTHROCYTE [DISTWIDTH] IN BLOOD: 14.3 % (ref 11.5–15.5)
ERYTHROCYTE [DISTWIDTH] IN BLOOD: 14.3 % (ref 11.5–15.5)
GLUCOSE SERPL-MCNC: 92 MG/DL (ref 74–99)
HCT VFR BLD AUTO: 40.1 % (ref 39–53)
HCT VFR BLD AUTO: 42.4 % (ref 39–53)
HGB BLD-MCNC: 13.5 GM/DL (ref 13–17.5)
HGB BLD-MCNC: 14.2 GM/DL (ref 13–17.5)
LYMPHOCYTES # SPEC AUTO: 0.5 K/UL (ref 1–4.8)
LYMPHOCYTES # SPEC AUTO: 0.9 K/UL (ref 1–4.8)
LYMPHOCYTES NFR SPEC AUTO: 10 %
LYMPHOCYTES NFR SPEC AUTO: 5 %
MCH RBC QN AUTO: 32.1 PG (ref 25–35)
MCH RBC QN AUTO: 32.3 PG (ref 25–35)
MCHC RBC AUTO-ENTMCNC: 33.5 G/DL (ref 31–37)
MCHC RBC AUTO-ENTMCNC: 33.6 G/DL (ref 31–37)
MCV RBC AUTO: 95.4 FL (ref 80–100)
MCV RBC AUTO: 96.3 FL (ref 80–100)
MONOCYTES # BLD AUTO: 0.2 K/UL (ref 0–1)
MONOCYTES # BLD AUTO: 0.6 K/UL (ref 0–1)
MONOCYTES NFR BLD AUTO: 2 %
MONOCYTES NFR BLD AUTO: 7 %
NEUTROPHILS # BLD AUTO: 10.8 K/UL (ref 1.3–7.7)
NEUTROPHILS # BLD AUTO: 7.1 K/UL (ref 1.3–7.7)
NEUTROPHILS NFR BLD AUTO: 78 %
NEUTROPHILS NFR BLD AUTO: 92 %
PLATELET # BLD AUTO: 249 K/UL (ref 150–450)
PLATELET # BLD AUTO: 251 K/UL (ref 150–450)
POTASSIUM SERPL-SCNC: 3.6 MMOL/L (ref 3.5–5.1)
PROT SERPL-MCNC: 6.1 G/DL (ref 6.3–8.2)
SODIUM SERPL-SCNC: 141 MMOL/L (ref 137–145)
WBC # BLD AUTO: 11.8 K/UL (ref 3.8–10.6)
WBC # BLD AUTO: 9.1 K/UL (ref 3.8–10.6)

## 2019-04-02 PROCEDURE — 5A09357 ASSISTANCE WITH RESPIRATORY VENTILATION, LESS THAN 24 CONSECUTIVE HOURS, CONTINUOUS POSITIVE AIRWAY PRESSURE: ICD-10-PCS

## 2019-04-02 PROCEDURE — 0QSJ04Z REPOSITION RIGHT FIBULA WITH INTERNAL FIXATION DEVICE, OPEN APPROACH: ICD-10-PCS

## 2019-04-02 PROCEDURE — 0MQQ0ZZ REPAIR RIGHT ANKLE BURSA AND LIGAMENT, OPEN APPROACH: ICD-10-PCS

## 2019-04-02 RX ADMIN — CEFAZOLIN SCH: 330 INJECTION, POWDER, FOR SOLUTION INTRAMUSCULAR; INTRAVENOUS at 08:07

## 2019-04-02 RX ADMIN — CEFAZOLIN SCH: 330 INJECTION, POWDER, FOR SOLUTION INTRAMUSCULAR; INTRAVENOUS at 22:27

## 2019-04-02 RX ADMIN — PANTOPRAZOLE SODIUM SCH: 40 TABLET, DELAYED RELEASE ORAL at 09:38

## 2019-04-02 RX ADMIN — HEPARIN SODIUM SCH: 5000 INJECTION, SOLUTION INTRAVENOUS; SUBCUTANEOUS at 08:03

## 2019-04-02 RX ADMIN — Medication SCH: at 09:38

## 2019-04-02 RX ADMIN — HEPARIN SODIUM SCH: 5000 INJECTION, SOLUTION INTRAVENOUS; SUBCUTANEOUS at 22:27

## 2019-04-02 RX ADMIN — PRAVASTATIN SODIUM SCH: 40 TABLET ORAL at 09:38

## 2019-04-02 RX ADMIN — MORPHINE SULFATE PRN MG: 4 INJECTION, SOLUTION INTRAMUSCULAR; INTRAVENOUS at 07:20

## 2019-04-02 RX ADMIN — POTASSIUM CHLORIDE SCH MLS: 14.9 INJECTION, SOLUTION INTRAVENOUS at 15:43

## 2019-04-02 RX ADMIN — Medication SCH MG: at 17:13

## 2019-04-02 RX ADMIN — HYDROMORPHONE HYDROCHLORIDE ONE MG: 1 INJECTION, SOLUTION INTRAMUSCULAR; INTRAVENOUS; SUBCUTANEOUS at 15:17

## 2019-04-02 RX ADMIN — HYDROMORPHONE HYDROCHLORIDE ONE MG: 1 INJECTION, SOLUTION INTRAMUSCULAR; INTRAVENOUS; SUBCUTANEOUS at 15:41

## 2019-04-02 RX ADMIN — ALLOPURINOL SCH: 300 TABLET ORAL at 09:38

## 2019-04-02 RX ADMIN — HYDROCODONE BITARTRATE AND ACETAMINOPHEN PRN EACH: 5; 325 TABLET ORAL at 18:59

## 2019-04-02 RX ADMIN — MORPHINE SULFATE PRN MG: 4 INJECTION, SOLUTION INTRAMUSCULAR; INTRAVENOUS at 03:02

## 2019-04-02 RX ADMIN — LEVOTHYROXINE SODIUM SCH: 75 TABLET ORAL at 05:44

## 2019-04-02 RX ADMIN — METOPROLOL SUCCINATE SCH MG: 50 TABLET, EXTENDED RELEASE ORAL at 07:20

## 2019-04-02 RX ADMIN — LOSARTAN POTASSIUM AND HYDROCHLOROTHIAZIDE SCH EACH: 12.5; 5 TABLET ORAL at 07:20

## 2019-04-02 NOTE — FL
Fluoroscopy

 

HISTORY: Ankle fracture

 

31 seconds fluoroscopy time supplied to the referring clinician.  2 intraoperative C-arm images docum
ent the procedure. See dictated report from orthopedic surgery.

## 2019-04-02 NOTE — P.OP
Date of Procedure: 04/02/19


Preoperative Diagnosis: 


1.  Right ankle fracture dislocation


2.  Morbid obesity with BMI 45.6


3.  History of chronic alcohol and drug abuse


4.  History of chronic lower extremity edema


5.  History of type 2 diabetes currently controlled with diet


Postoperative Diagnosis: 


Same


Procedure(s) Performed: 


1.  Open reduction internal fixation of right lateral malleolus


2.  Open reduction and internal fixation of right syndesmosis


3.  Open right ankle deltoid ligament repair


4.  Manual application of joint stress by physician for radiography, right ankle


5.  Application of short leg splint by physician, right ankle


Anesthesia: spinal


Surgeon: Vance Chavez


Assistant #1: Jo Fortune


Estimated Blood Loss (ml): 25


IV fluids (ml): 1,100


Pathology: none sent


Condition: stable


Disposition: PACU


Indications for Procedure: 


The patient is a 63-year-old male with multiple medical problems including 

morbid obesity with BMI 45.6 and chronic alcohol and drug abuse who sustained a 

fall 1 week ago resulting in an isolated injury to his ankle.  The patient did 

not seek medical attention until yesterday.  According to the patient's sister 

her brother contacted the patient last Tuesday after his fall and said that he 

sounded extremely intoxicated.  The patient rolled around his house on his hand 

and feet.  Yesterday he states that he had another injury and heard a pop.  He 

was brought to the emergency department where his ankle was found to be grossly 

dislocated and he had a completely displaced lateral malleolus fracture.  

Attempt was made by the emergency department to reduce the ankle which was 

unsuccessful.  I met with the patient in the emergency department and performed 

a closed reduction and splinting.  We reassessed his soft tissue envelope this 

morning and it appeared amenable for surgery.  I discussed the potential risks 

and complications of surgery including but not limited to risk of anesthesia, 

superficial infection, deep infection, delayed wound healing, superficial wound 

necrosis, deep wound necrosis, nonunion of the fracture, malunion of the 

fracture, malreduction of the ankle or syndesmosis, postoperative hardware 

failure resulting in displacement of the ankle, chronic pain, chronic swelling, 

DVT, PE need for further surgery, post traumatic arthritis, other medical 

complications, and possibly loss of life or limb.  The patient understands that 

he is at an increased risk of having a complication due to his weight and 

substance abuse issues.  He understands the importance of following 

postoperative weightbearing restrictions.  He provided his verbal and written 

consent to go forward with surgery.


Operative Findings: 


The patient's right ankle was found to be grossly unstable with complete 

disruption of the deltoid ligament of the medial malleolus


Description of Procedure: 


The patient was identified and operative holding and the correct right leg was 

marked with my initials.  I reviewed the consent form with the patient and his 

family.  All their questions were answered.  The patient was given a popliteal 

and saphenous nerve block by anesthesia.  He was then brought back to the 

operating room where spinal anesthetic was administered.  The right leg had a 

tourniquet applied to the proximal aspect of the thigh.  All bony prominences 

were well-padded.  He was secured to the table in the sloppy lateral position 

with a beanbag.  A ramp was placed under the right leg to facilitate imaging.  

The right leg was then prepped and draped in the standard sterile fashion.  

Prior to starting surgery timeout was performed identifying the correct patient,

operative extremity, and procedure.  The patient's leg was elevated, 

exsanguinated with an Esmarch bandage, and the tourniquet was inflated to 250 

mmHg.





I began by outlining a straight lateral incision to the distal fibula.  Skin 

incision is made with a scalpel.  Dissection was carried down carefully to the 

subcu tissues tissue with tenotomy scissors.  The fascia over the peroneal 

muscles was incised longitudinally and the periosteum over the distal fibula was

incised distally exposing the fracture site.  The fracture site was carefully 

debrided.  The fracture was reduced and held clamped together with a small 

point-to-point reduction clamp.  C-arm fluoroscopy was brought in to verify the 

reduction.  I then placed a nonlocking 2.7 mm lag screw obliquely across the f

racture from dorsal lateral to distal medial.  A nonlocking 2.7 mm screw was 

placed under excellent compression across the fracture.  A locking precontoured 

distal fibular plate was then placed against the lateral aspect of the fibula.  

A nonlocking 3.5 mm screw was placed in the third hole of the plate proximal to 

the fracture bringing the plate down to bone.  I then centered the plate on the 

fibula and placed a second nonlocking 3.5 mm screw in the most proximal plate 

hole.  Attention was then turned distally.  The plate was brought down with a 

nonlocking 2.7 mm screw and then the cluster of holes was filled with locking 

2.7 mm screws.  The 2.7 mm screw was then exchanged for a locking screw.  

Attention was then turned medially.  A longitudinal incision was made over the 

medial malleolus.  Dissection was carried down carefully to the subcutaneous 

tissue with tenotomy scissors.  The deltoid ligament complex was completely 

avulsed off of the medial malleolus.  The anterior surface of the medial 

malleolus was roughened with a ronguer.  A 2.0 mm drill bit was used could a 

 hole and then a 3.0 mm suture anchor was placed in the medial malleolus.  

The distal portion of the deltoid ligament was grasped using horizontal mattress

stitches.  An assistant gently inverted the ankle and the sutures were tied down

over the medial malleolus.  A manual external rotation stress x-ray was then 

obtained which showed significant improvement of the stability of the ankle but 

continued widening of the medial clear space.  A large pelvic reduction clamp 

was placed with 1 page over the distal plate and other page over the medial 

malleolus.  It was gently tightened and 2 nonlocking 3.5 mm syndesmotic screws 

were placed through the plate, across the fibula and into the tibia.  Final 

fluoroscopic images were taken including a mortise and lateral view.  The talus 

was centered under the tibial plafond and and the hardware was in acceptable 

position.  A manual external rotation stress x-ray showed no widening of the 

medial clear space or incisura.  I interpreted this as a stable ankle mortise.  

The wound was then copiously irrigated and closed in layers.  I verified that 

all instrument, sponge, and sharp counts were correct.  A sterile dressing 

consisting of Betadine soaked Adaptic, 4 x 4, and web roll was applied.  A well-

padded bulky Salinas splint was placed with the ankle in neutral.  The patient was

awoken from his sedation, transferred to a gurney, and brought to PACU without 

the procedure well.





Jo Fortune PA-C was required as a skilled assistant for patient positioning, 

surgical exposure, reduction of fracture, placement of hardware, closure of 

incisions, and placement of splint.





Plan: The patient is going to be admitted for pain control, IV antibiotics, and 

discharge planning.  He is to remain strictly nonweightbearing on his right leg.

 Internal medicine has been consulted to assist with perioperative medical 

management.  I anticipate he will need discharge to either rehab or subacute 

nursing facility.

## 2019-04-02 NOTE — P.PN
Subjective


Progress Note Date: 04/02/19


The patient was examined in preoperative holding to assess the soft tissue 

envelope over his right ankle.  The bulky Salinas splint was taken down and on 

inspection of the skin over the ankle there are some resolution of swelling and 

mild wrinkling of the skin.  There are no open wounds or fracture blisters.  The

soft tissue envelope appears amenable to proceed with surgery.





Objective





- Vital Signs


Vital signs: 


                                   Vital Signs











Temp  99.0 F   04/02/19 11:54


 


Pulse  66   04/02/19 11:54


 


Resp  16   04/02/19 11:54


 


BP  168/79   04/02/19 11:54


 


Pulse Ox  94 L  04/02/19 11:54








                                 Intake & Output











 04/01/19 04/02/19 04/02/19





 18:59 06:59 18:59


 


Intake Total  600 


 


Output Total  1300 


 


Balance  -700 


 


Weight 170.097 kg  


 


Intake:   


 


  Intake, IV Titration  600 





  Amount   


 


    Sodium Chloride 0.9% 1,  600 





    000 ml @ 75 mls/hr IV .   





    [id number] AMANDA Rx#:[id number]   


 


Output:   


 


  Urine  1300 


 


    Uretheral (Cortez)  700 


 


Other:   


 


  Voiding Method  Indwelling Catheter Indwelling Catheter














- Labs


CBC & Chem 7: 


                                 04/02/19 06:57





                                 04/02/19 06:57


Labs: 


                  Abnormal Lab Results - Last 24 Hours (Table)











  04/01/19 04/01/19 04/02/19 Range/Units





  19:50 19:50 06:57 


 


WBC  12.7 H    (3.8-10.6)  k/uL


 


RBC  4.25 L    (4.30-5.90)  m/uL


 


Neutrophils #  9.9 H    (1.3-7.7)  k/uL


 


Lymphocytes #     (1.0-4.8)  k/uL


 


Potassium   3.3 L   (3.5-5.1)  mmol/L


 


Chloride    108 H  ()  mmol/L


 


BUN   28 H  23 H  (9-20)  mg/dL


 


Glucose   105 H   (74-99)  mg/dL


 


Total Bilirubin   1.6 H  1.6 H  (0.2-1.3)  mg/dL


 


Total Protein    6.1 L  (6.3-8.2)  g/dL














  04/02/19 Range/Units





  06:57 


 


WBC   (3.8-10.6)  k/uL


 


RBC  4.20 L  (4.30-5.90)  m/uL


 


Neutrophils #   (1.3-7.7)  k/uL


 


Lymphocytes #  0.9 L  (1.0-4.8)  k/uL


 


Potassium   (3.5-5.1)  mmol/L


 


Chloride   ()  mmol/L


 


BUN   (9-20)  mg/dL


 


Glucose   (74-99)  mg/dL


 


Total Bilirubin   (0.2-1.3)  mg/dL


 


Total Protein   (6.3-8.2)  g/dL














Assessment and Plan


(1) Dislocation of ankle, right, closed


Current Visit: Yes   Status: Acute   Code(s): S93.04XA - DISLOCATION OF RIGHT 

ANKLE JOINT, INITIAL ENCOUNTER   SNOMED Code(s): 948811336


   





(2) Right fibular fracture


Current Visit: Yes   Status: Acute   Code(s): S82.401A - UNSP FRACTURE OF SHAFT 

OF RIGHT FIBULA, INIT FOR CLOS FX   SNOMED Code(s): 58355461

## 2019-04-02 NOTE — PN
PROGRESS NOTE



DATE OF SERVICE:

04/02/2019.



HISTORY:

This 63-year-old gentleman admitted with right ankle fracture underwent ORIF of the

right ankle fracture by Dr. Chavez.  The swelling is much improved.  No chest pain.

No palpitations.  No fever.



EXAM:

Alert and oriented x3. Pulse is 69, blood pressure 143/87, respirations 18, temperature

97.8, pulse ox 97% on 2 L.

HEENT: Conjunctivae normal. Oral mucosa moist.

NECK: Supple. No JVD.

CARDIOVASCULAR: S1 and S2 muffled.

LUNGS: Breath sounds diminished at the bases. No rhonchi, no crackles.

ABDOMEN: Soft, obese, nontender.

EXTREMITIES: Legs status post surgery.

CNS: No focal deficits.



LABS:

WBC 11.8.



ASSESSMENT:

1. Right ankle fracture status post ORIF.

2. Sleep apnea, on CPAP at 16.

3. Super morbid obesity, BMI of 45.6.

4. Increased WBC.

5. Hypokalemia, mild.

6. Diabetes mellitus type 2.

7. Hypertension.

8. Hyperlipidemia.

9. History of hypothyroidism.

10.Gout.

11.History of DJD.

12.Back problems.

13.History of nicotine dependence.

14.History of EtOH.



RECOMMENDATIONS:

Continue current monitoring and symptomatic treatment. Otherwise symptomatic treatment

will be provided.  Continue with CPAP at night as before.  Monitor the blood sugars

closely.  Guarded prognosis.  Further recommendations to follow.





MMODL / AYANAN: [id number] / Job#: [id number]

## 2019-04-03 LAB
GLUCOSE BLD-MCNC: 109 MG/DL (ref 75–99)
GLUCOSE BLD-MCNC: 115 MG/DL (ref 75–99)
GLUCOSE BLD-MCNC: 125 MG/DL (ref 75–99)
GLUCOSE BLD-MCNC: 125 MG/DL (ref 75–99)

## 2019-04-03 RX ADMIN — INSULIN ASPART SCH: 100 INJECTION, SOLUTION INTRAVENOUS; SUBCUTANEOUS at 10:26

## 2019-04-03 RX ADMIN — INSULIN ASPART SCH: 100 INJECTION, SOLUTION INTRAVENOUS; SUBCUTANEOUS at 21:09

## 2019-04-03 RX ADMIN — HYDROCODONE BITARTRATE AND ACETAMINOPHEN PRN EACH: 5; 325 TABLET ORAL at 06:10

## 2019-04-03 RX ADMIN — METOPROLOL SUCCINATE SCH MG: 50 TABLET, EXTENDED RELEASE ORAL at 10:28

## 2019-04-03 RX ADMIN — PRAVASTATIN SODIUM SCH MG: 40 TABLET ORAL at 10:28

## 2019-04-03 RX ADMIN — HYDROCODONE BITARTRATE AND ACETAMINOPHEN PRN EACH: 5; 325 TABLET ORAL at 18:22

## 2019-04-03 RX ADMIN — ALLOPURINOL SCH MG: 300 TABLET ORAL at 10:29

## 2019-04-03 RX ADMIN — Medication SCH MG: at 12:29

## 2019-04-03 RX ADMIN — Medication SCH MG: at 18:23

## 2019-04-03 RX ADMIN — POTASSIUM CHLORIDE SCH: 14.9 INJECTION, SOLUTION INTRAVENOUS at 02:34

## 2019-04-03 RX ADMIN — LEVOTHYROXINE SODIUM SCH MCG: 75 TABLET ORAL at 06:10

## 2019-04-03 RX ADMIN — POTASSIUM CHLORIDE SCH: 14.9 INJECTION, SOLUTION INTRAVENOUS at 12:16

## 2019-04-03 RX ADMIN — INSULIN ASPART SCH: 100 INJECTION, SOLUTION INTRAVENOUS; SUBCUTANEOUS at 18:07

## 2019-04-03 RX ADMIN — POTASSIUM CHLORIDE SCH: 14.9 INJECTION, SOLUTION INTRAVENOUS at 22:53

## 2019-04-03 RX ADMIN — HYDROCODONE BITARTRATE AND ACETAMINOPHEN PRN EACH: 5; 325 TABLET ORAL at 00:34

## 2019-04-03 RX ADMIN — CEFAZOLIN SCH: 330 INJECTION, POWDER, FOR SOLUTION INTRAMUSCULAR; INTRAVENOUS at 10:30

## 2019-04-03 RX ADMIN — HEPARIN SODIUM SCH UNIT: 5000 INJECTION, SOLUTION INTRAVENOUS; SUBCUTANEOUS at 21:25

## 2019-04-03 RX ADMIN — HYDROCODONE BITARTRATE AND ACETAMINOPHEN PRN EACH: 5; 325 TABLET ORAL at 10:27

## 2019-04-03 RX ADMIN — HEPARIN SODIUM SCH UNIT: 5000 INJECTION, SOLUTION INTRAVENOUS; SUBCUTANEOUS at 10:29

## 2019-04-03 RX ADMIN — CEFAZOLIN SCH: 330 INJECTION, POWDER, FOR SOLUTION INTRAMUSCULAR; INTRAVENOUS at 22:53

## 2019-04-03 RX ADMIN — INSULIN ASPART SCH: 100 INJECTION, SOLUTION INTRAVENOUS; SUBCUTANEOUS at 12:15

## 2019-04-03 RX ADMIN — LOSARTAN POTASSIUM AND HYDROCHLOROTHIAZIDE SCH EACH: 12.5; 5 TABLET ORAL at 10:26

## 2019-04-03 RX ADMIN — PANTOPRAZOLE SODIUM SCH MG: 40 TABLET, DELAYED RELEASE ORAL at 10:29

## 2019-04-03 NOTE — PN
PROGRESS NOTE



DATE OF SERVICE:

04/03/2019



This 63-year-old gentleman who was admitted with right ankle fracture and ORIF had

significant swelling of the legs but no chest pain or palpitation.  No fever.  The

patient also has a history of sleep apnea.



On exam, alert and oriented x3. Pulse is 85, blood pressure 180/96, respirations 16,

temperature 98.2, pulse ox 96% on room air.

HEENT: Conjunctivae normal.

NECK: No jugular venous distention.

CARDIOVASCULAR SYSTEM:  S1, S2 muffled.

RESPIRATORY SYSTEM: Breath sounds diminished at the bases.  A few scattered rhonchi. No

crackles.

ABDOMEN: Soft, obese, non-tender.

LEGS: Status post right leg ORIF.

NERVOUS SYSTEM: No focal deficit.



LABS: WBC 11.8 and glucose 115.



ASSESSMENT:

1. Right ankle fracture, status post open reduction internal fixation.

2. Sleep apnea with CPAP at 16.

3. Super morbid obesity with body mass index of 45.6.

4. Increased white count.

5. Hypokalemia, mild.

6. Bilateral leg swelling, improving.

7. Diabetes mellitus, type 2.

8. Hypertension.

9. Hyperlipidemia.

10.History of hypothyroidism.

11.Gait dysfunction.

12.Gout history.

13.History of degenerative joint disease.

14.History of back problems.

15.History of nicotine dependence.

16.History of ethanol.



RECOMMENDATIONS AND DISCUSSION:

I recommend to continue current medications, continue with the monitoring, symptomatic

treatment. Otherwise at this time we will monitor the patient closely, continue the

home medications, including allopurinol.  Otherwise, we will continue with DVT

prophylaxis. I would also recommend PT/OT evaluation, possible ECF rehab.  The rest of

the medications per Orthopedic Surgery. Further recommendations to follow.





MMODL / IJN: [id number] / Job#: [id number]

## 2019-04-03 NOTE — P.PN
Subjective


Progress Note Date: 04/03/19


The patient is a 63-year-old male with a BMI of 45.6, history of chronic alcohol

drug abuse, type 2 diabetes, sleep apnea, hypertension and hyperlipidemia who 

lives alone and presented to [facility] ED with a right ankle fracture-dislocation.

He fell last Tuesday injuring his ankle. It was painful at that time, but he 

didn't seek medical attention. He was initially able to walk, but then had 

increased pain and he started crawling on his hands and knees developing sores 

on his feet. He re-injured his ankle today and felt a "pop" and had increased 

pain. He was brought into the ER. An attempt was made at closed reduction and 

splinting, but following application of the splint the ankle was still found to 

be significantly displaced. Orthopaedics was consulted for assistance in the 

reduction.  Patient underwent an open reduction and internal fixation of the 

right lateral malleolus, right syndesmosis, and right deltoid ligament repair on

4/2/19 with Dr. Chavez.





Today's postoperative day #1.  Patient states he overall feels well, his pain is

very well-controlled at the moment.  He has not yet been up with therapy today.


He denies chest pain, shortness of breath, nausea, vomiting, fevers or chills.  

He has no new complaints this morning.   Vital signs stable.  





Objective





- Vital Signs


Vital signs: 


                                   Vital Signs











Temp  98.2 F   04/03/19 15:00


 


Pulse  84   04/03/19 15:00


 


Resp  16   04/03/19 16:00


 


BP  180/96   04/03/19 15:00


 


Pulse Ox  96   04/03/19 15:00








                                 Intake & Output











 04/03/19 04/03/19 04/04/19





 06:59 18:59 06:59


 


Intake Total 250  


 


Output Total 600 600 


 


Balance -350 -600 


 


Intake:   


 


  Intake, IV Titration 100  





  Amount   


 


    ceFAZolin 3 gm In Sodium 100  





    Chloride 0.9% 100 ml @   





    200 mls/hr IVPB [facility]   





    Rx#:[id number]   


 


  Oral 150  


 


Output:   


 


  Urine 600 600 


 


Other:   


 


  Voiding Method Indwelling Catheter Indwelling Catheter 














- Exam


On examination, the patient is lying in bed in no acute distress.  The patient 

is alert and oriented 3.  On inspection of the right lower extremity, there is 

a bulky Salinas splint in place. The splint is clean, dry, and intact.  The right 

toes are warm and well perfused, with capillary refill less than 2 seconds.  The

patient is able to wiggle his left toes without difficulty.  Sensation is intact

to light touch of the right toes.  Neurovascular is intact of the right lower 

extremity.  The left calf is soft and nontender.








- Labs


CBC & Chem 7: 


                                 04/02/19 16:52





                                 04/02/19 06:57


Labs: 


                  Abnormal Lab Results - Last 24 Hours (Table)











  04/03/19 04/03/19 04/03/19 Range/Units





  07:40 11:32 16:57 


 


POC Glucose (mg/dL)  115 H  109 H  125 H  (75-99)  mg/dL














Assessment and Plan


Assessment: 


Right ankle fracture dislocation status-post open reduction and internal 

fixation of the right lateral malleolus, right syndesmosis, and right deltoid 

ligament repair on 4/2/19.





Plan: 


- Strict nonweightbearing of the right lower extremity.  Ice and elevate the 

left lower extremity to decrease pain and swelling.


 - Physical therapy for gait and balance training.


 - Continue pain management.


 - Lovenox while he is inpatient for anticoagulation.


 - 2 doses of postoperative antibiotics complete.


 - Appreciate medicine consult for medical management.


 - Anticipate discharge to a rehab facility within the next 24-48 hours.

## 2019-04-04 LAB
GLUCOSE BLD-MCNC: 101 MG/DL (ref 75–99)
GLUCOSE BLD-MCNC: 107 MG/DL (ref 75–99)
GLUCOSE BLD-MCNC: 111 MG/DL (ref 75–99)
GLUCOSE BLD-MCNC: 93 MG/DL (ref 75–99)

## 2019-04-04 RX ADMIN — INSULIN ASPART SCH: 100 INJECTION, SOLUTION INTRAVENOUS; SUBCUTANEOUS at 07:26

## 2019-04-04 RX ADMIN — INSULIN ASPART SCH: 100 INJECTION, SOLUTION INTRAVENOUS; SUBCUTANEOUS at 21:56

## 2019-04-04 RX ADMIN — HYDROCODONE BITARTRATE AND ACETAMINOPHEN PRN EACH: 5; 325 TABLET ORAL at 05:37

## 2019-04-04 RX ADMIN — ALLOPURINOL SCH MG: 300 TABLET ORAL at 08:32

## 2019-04-04 RX ADMIN — HEPARIN SODIUM SCH UNIT: 5000 INJECTION, SOLUTION INTRAVENOUS; SUBCUTANEOUS at 08:32

## 2019-04-04 RX ADMIN — METOPROLOL SUCCINATE SCH MG: 50 TABLET, EXTENDED RELEASE ORAL at 08:31

## 2019-04-04 RX ADMIN — LEVOTHYROXINE SODIUM SCH MCG: 75 TABLET ORAL at 05:37

## 2019-04-04 RX ADMIN — LOSARTAN POTASSIUM AND HYDROCHLOROTHIAZIDE SCH EACH: 12.5; 5 TABLET ORAL at 08:31

## 2019-04-04 RX ADMIN — CEFAZOLIN SCH GM: 10 INJECTION, POWDER, FOR SOLUTION INTRAVENOUS at 19:41

## 2019-04-04 RX ADMIN — POTASSIUM CHLORIDE SCH: 14.9 INJECTION, SOLUTION INTRAVENOUS at 21:40

## 2019-04-04 RX ADMIN — Medication SCH MG: at 11:40

## 2019-04-04 RX ADMIN — Medication SCH EACH: at 19:41

## 2019-04-04 RX ADMIN — HYDROCODONE BITARTRATE AND ACETAMINOPHEN PRN EACH: 5; 325 TABLET ORAL at 11:39

## 2019-04-04 RX ADMIN — INSULIN ASPART SCH: 100 INJECTION, SOLUTION INTRAVENOUS; SUBCUTANEOUS at 17:26

## 2019-04-04 RX ADMIN — PANTOPRAZOLE SODIUM SCH MG: 40 TABLET, DELAYED RELEASE ORAL at 08:32

## 2019-04-04 RX ADMIN — PRAVASTATIN SODIUM SCH MG: 40 TABLET ORAL at 08:31

## 2019-04-04 RX ADMIN — INSULIN ASPART SCH: 100 INJECTION, SOLUTION INTRAVENOUS; SUBCUTANEOUS at 12:12

## 2019-04-04 RX ADMIN — HEPARIN SODIUM SCH UNIT: 5000 INJECTION, SOLUTION INTRAVENOUS; SUBCUTANEOUS at 20:32

## 2019-04-04 RX ADMIN — CEFAZOLIN SCH GM: 10 INJECTION, POWDER, FOR SOLUTION INTRAVENOUS at 23:32

## 2019-04-04 RX ADMIN — POTASSIUM CHLORIDE SCH: 14.9 INJECTION, SOLUTION INTRAVENOUS at 05:36

## 2019-04-04 RX ADMIN — HYDROCODONE BITARTRATE AND ACETAMINOPHEN PRN EACH: 5; 325 TABLET ORAL at 19:58

## 2019-04-04 RX ADMIN — Medication SCH MG: at 17:25

## 2019-04-04 RX ADMIN — CEFAZOLIN SCH: 330 INJECTION, POWDER, FOR SOLUTION INTRAMUSCULAR; INTRAVENOUS at 14:18

## 2019-04-04 NOTE — P.PN
Subjective


Progress Note Date: 04/04/19


The patient is a 63-year-old male with a BMI of 45.6, history of chronic alcohol

drug abuse, type 2 diabetes, sleep apnea, hypertension and hyperlipidemia who 

lives alone and presented to [facility] ED with a right ankle fracture-dislocation.

He fell last Tuesday injuring his ankle. It was painful at that time, but he 

didn't seek medical attention. He was initially able to walk, but then had 

increased pain and he started crawling on his hands and knees developing sores 

on his feet. He re-injured his ankle today and felt a "pop" and had increased 

pain. He was brought into the ER. An attempt was made at closed reduction and 

splinting, but following application of the splint the ankle was still found to 

be significantly displaced. Orthopaedics was consulted for assistance in the 

reduction.  Patient underwent an open reduction and internal fixation of the 

right lateral malleolus, right syndesmosis, and right deltoid ligament repair on

4/2/19 with Dr. Chavez.





Today's postoperative day #2. Patient continues to feel well today. He has been 

up with therapy today. He has been remaining non-weight bearing on the right 

leg, per patient. Patient states his pain is well-controlled. He denies chest 

pain, shortness of breath, nausea, vomiting. He has no new complaints today. 

Vital signs stable. 


 





Objective





- Vital Signs


Vital signs: 


                                   Vital Signs











Temp  98.2 F   04/04/19 07:28


 


Pulse  73   04/04/19 07:28


 


Resp  18   04/04/19 07:28


 


BP  144/73   04/04/19 07:28


 


Pulse Ox  100   04/04/19 07:28








                                 Intake & Output











 04/03/19 04/04/19 04/04/19





 18:59 06:59 18:59


 


Intake Total  1080 


 


Output Total 600  


 


Balance -600 1080 


 


Intake:   


 


  Oral  1080 


 


Output:   


 


  Urine 600  


 


Other:   


 


  Voiding Method Indwelling Catheter Indwelling Catheter 


 


  # Voids  2 














- Exam


On examination, the patient is sitting up in the chair in no acute distress.  

The patient is alert and oriented 3.  On inspection of the right lower 

extremity, there is a bulky Salinas splint in place. The splint is clean, dry, and

intact.  The right toes are warm and well perfused, with capillary refill less t

nathan 2 seconds.  The patient is able to wiggle his left toes without difficulty. 

Sensation is intact to light touch of the right toes.  Neurovascular is intact 

of the right lower extremity.  The left calf is soft and nontender.








- Labs


CBC & Chem 7: 


                                 04/02/19 16:52





                                 04/02/19 06:57


Labs: 


                  Abnormal Lab Results - Last 24 Hours (Table)











  04/03/19 04/03/19 04/04/19 Range/Units





  16:57 20:29 12:04 


 


POC Glucose (mg/dL)  125 H  125 H  101 H  (75-99)  mg/dL














Assessment and Plan


Assessment: 


Right ankle fracture dislocation status-post open reduction and internal 

fixation of the right lateral malleolus, right syndesmosis, and right deltoid 

ligament repair on 4/2/19.





Plan: 


- Strict nonweightbearing of the right lower extremity.  Ice and elevate the 

left lower extremity to decrease pain and swelling.


 - Physical therapy for gait and balance training.


 - Continue pain management.


 - Lovenox while he is inpatient for anticoagulation.


 - 2 doses of postoperative antibiotics complete.


 - Appreciate internal medicine consult for medical management.


 - Anticipate discharge to a rehab facility tomorrow, pending medical clearance.

## 2019-04-05 VITALS — HEART RATE: 58 BPM

## 2019-04-05 VITALS — RESPIRATION RATE: 17 BRPM | SYSTOLIC BLOOD PRESSURE: 136 MMHG | TEMPERATURE: 98.4 F | DIASTOLIC BLOOD PRESSURE: 84 MMHG

## 2019-04-05 LAB
GLUCOSE BLD-MCNC: 90 MG/DL (ref 75–99)
GLUCOSE BLD-MCNC: 91 MG/DL (ref 75–99)

## 2019-04-05 RX ADMIN — METOPROLOL SUCCINATE SCH MG: 50 TABLET, EXTENDED RELEASE ORAL at 08:44

## 2019-04-05 RX ADMIN — Medication SCH EACH: at 08:44

## 2019-04-05 RX ADMIN — HEPARIN SODIUM SCH UNIT: 5000 INJECTION, SOLUTION INTRAVENOUS; SUBCUTANEOUS at 08:43

## 2019-04-05 RX ADMIN — LEVOTHYROXINE SODIUM SCH MCG: 75 TABLET ORAL at 06:06

## 2019-04-05 RX ADMIN — POTASSIUM CHLORIDE SCH: 14.9 INJECTION, SOLUTION INTRAVENOUS at 01:30

## 2019-04-05 RX ADMIN — PRAVASTATIN SODIUM SCH MG: 40 TABLET ORAL at 08:43

## 2019-04-05 RX ADMIN — HYDROCODONE BITARTRATE AND ACETAMINOPHEN PRN EACH: 5; 325 TABLET ORAL at 11:55

## 2019-04-05 RX ADMIN — CEFAZOLIN SCH GM: 10 INJECTION, POWDER, FOR SOLUTION INTRAVENOUS at 08:43

## 2019-04-05 RX ADMIN — ALLOPURINOL SCH MG: 300 TABLET ORAL at 08:44

## 2019-04-05 RX ADMIN — POTASSIUM CHLORIDE SCH: 14.9 INJECTION, SOLUTION INTRAVENOUS at 08:44

## 2019-04-05 RX ADMIN — Medication SCH MG: at 08:44

## 2019-04-05 RX ADMIN — INSULIN ASPART SCH: 100 INJECTION, SOLUTION INTRAVENOUS; SUBCUTANEOUS at 08:38

## 2019-04-05 RX ADMIN — HYDROCODONE BITARTRATE AND ACETAMINOPHEN PRN EACH: 5; 325 TABLET ORAL at 06:05

## 2019-04-05 RX ADMIN — LOSARTAN POTASSIUM AND HYDROCHLOROTHIAZIDE SCH EACH: 12.5; 5 TABLET ORAL at 08:43

## 2019-04-05 RX ADMIN — PANTOPRAZOLE SODIUM SCH MG: 40 TABLET, DELAYED RELEASE ORAL at 08:44

## 2019-04-05 RX ADMIN — INSULIN ASPART SCH: 100 INJECTION, SOLUTION INTRAVENOUS; SUBCUTANEOUS at 11:47

## 2019-04-05 NOTE — P.DS
Providers


Date of admission: 


04/02/19 09:52





Attending physician: 


Vance Chavez





Consults: 





                                        





04/01/19 17:45


Consult Physician Urgent 


   Consulting Provider: Smita Weiner


   Consult Reason/Comments: medical clearance for operation


   Do you want consulting provider notified?: Yes











Primary care physician: 


[facility] Course: 


This patient is a 63-year-old male with multiple medical problems including 

morbid obesity with BMI 45.6 and chronic alcohol and drug abuse who sustained a 

fall resulting in a right ankle fracture dislocation.  The patient fell about a 

week ago, he did not initially seek medical attention.  Patient mother's house 

is hands and feet for about a week, and states he had another injury when he 

heard a pop in the same ankle.  He was subsequently brought to [facility] emergency

department where he think was found to be grossly loose with indicated he had a 

completely displaced lateral malleolus fracture.  Patient ankles well reduced in

the ED, and was admitted for further evaluation and treatment.  Patient's soft 

tissues were reevaluated on 4/2/19, and were found to be amenable to undergo 

surgery.  Patient underwent an open reduction and internal fixation of the right

ankle on  4/2/2019 with Dr. Chavez. 





The procedure is performed without complication or sequelae.  The patient is 

doing well postoperatively.  Vital signs are stable on postop day #3.





The patient is examined bedside this morning.  The patient states he is 

experiencing minimal pain in the right ankle.  He rates this pain a 2/10.  

Patient states he is a physical therapy and yesterday, and is having no issues 

transferring to the chair.  He states the lozano catheter has no yet been 

removed, as he is having issues using a urinal for urination. Patient states he 

is tolerating his diet well. Patient denies chest pain, shortness of breath, 

nausea, vomiting. Vital signs stable. 








On examination, the patient is sitting up in bed in no acute distress. Patient 

is alert and orientated x3. On inspection of the right lower extremity, there is

a bulky Salinas splint in place.  Splint is clean, dry, intact. There is no 

drainage or bleeding through the splint. Patient is able to wiggle his toes 

without issue.  The toes are warm and well-perfused with brisk capillary refill.

 Sensation is intact to light touch of the dorsal and plantar foot, as well as 

first dorsal webspace.  


The left calf is soft and non-tender to palpation. 





The patient is discharged to rehab today, pending medical clearance today.  

Please refer to the [facility] for accurate list of medications.





Patient Condition at Discharge: Stable





Plan - Discharge Summary


Discharge Rx Participant: No


New Discharge Prescriptions: 


New


   Aspirin 325 mg PO DAILY #14 tab


   Docusate [Colace] 100 mg PO BID #60 capsule


   Hydrocodone/Acetaminophen [Norco 5-325] 1 tab PO Q6H PRN #28 tab


     PRN Reason: Pain





No Action


   Levothyroxine Sodium [Synthroid] 75 mcg PO DAILY


   Pravastatin Sodium [Pravachol] 40 mg PO DAILY


   Allopurinol [Zyloprim] 300 mg PO DAILY


   Metoprolol Succinate (ER) [Toprol Xl] 50 mg PO DAILY


   amLODIPine [Norvasc] 10 mg PO DAILY


   Losartan/Hydrochlorothiazide [Losartan-Hctz 100-25 mg Tab] 1 tab PO DAILY


   amLODIPine [Norvasc] 5 mg PO DAILY


Discharge Medication List





Levothyroxine Sodium [Synthroid] 75 mcg PO DAILY 05/07/14 [History]


Pravastatin Sodium [Pravachol] 40 mg PO DAILY 05/07/14 [History]


Allopurinol [Zyloprim] 300 mg PO DAILY 05/12/14 [History]


Losartan/Hydrochlorothiazide [Losartan-Hctz 100-25 mg Tab] 1 tab PO DAILY 

01/02/18 [History]


Metoprolol Succinate (ER) [Toprol Xl] 50 mg PO DAILY 01/02/18 [History]


amLODIPine [Norvasc] 10 mg PO DAILY 01/02/18 [History]


amLODIPine [Norvasc] 5 mg PO DAILY 04/01/19 [History]


Aspirin 325 mg PO DAILY #14 tab 04/05/19 [Rx]


Docusate [Colace] 100 mg PO BID #60 capsule 04/05/19 [Rx]


Hydrocodone/Acetaminophen [Norco 5-325] 1 tab PO Q6H PRN #28 tab 04/05/19 [Rx]








Follow up Appointment(s)/Referral(s): 


Graeme Umaña MD [Primary Care Provider] - 1-2 days


Vance Chavez MD [Medical Doctor] - 2 Weeks


Activity/Diet/Wound Care/Special Instructions: 


-Strict non-weight bearing on your operative leg. Do not remove your splint; 

Keep splint clean, dry, and intact


-Use crutches, knee scooter, or a walker to ambulate after surgery. 


-Elevate and ice operative leg to help reduce swelling and control pain.  


-Take pain medications as prescribed. Take Colace as a stool softener. Take 

aspirin as prescribed for blood clot prevention. 


-Follow-up appointment with Dr. Chavez in the office in 2 weeks. 


-Call the office with any questions or concerns, [phone]





- We discussed the patient's history of drug abuse and his current prescription 

for Norco, an opioid.  Patient states he will only take this medication when 

needed for pain.  He voices the risk of taking this medication. He states he 

will only take this medication for 1-2 weeks longer.  He states he will stop 

taking this medication when no longer needed for pain control.  He will then 

switch to an over-the-counter pain medication, such as Tylenol.  Patient voices 

agreement with this plan.


Discharge Disposition: TRANSFER TO SNF/ECF

## 2019-04-05 NOTE — PN
PROGRESS NOTE



DATE OF SERVICE:

04/04/2019



This 61-year-old gentleman was admitted with right ankle fracture is being closely

monitored patient.  The patient had swelling earlier but much improved awaiting ECF

placement.  No chest pain.  No palpitation.



EXAM:

Alert and oriented times three. Pulse 61, blood pressure 110/75, respiration 14,

temperature 98.2, pulse ox 98% on room air.  HEENT: Conjunctivae normal.  NECK: No

jugular venous distention.

CARDIOVASCULAR: S1, S2 muffled.

RESPIRATORY: Breath sounds diminished in the bases.  No rhonchi.  No crackles.  Abdomen

is soft, nontender.  No mass palpable.  Legs are no edema, no swelling.

CENTRAL NERVOUS SYSTEM: No focal deficits.



LABS:

WBC 11.8, hemoglobin 14.2, glucose 125, vitamin D is 26.5.



ASSESSMENT:

1. Right ankle fracture, status post open reduction and internal fixation.

2. Sleep apnea, on CPAP 60.

3. Super morbid obesity with body mass index of 45.6, vitamin D deficiency.

4. Increased WBC.

5. Hypokalemia, mild.

6. Right leg swelling, improving.

7. Diabetes mellitus type 2.

8. Hypertension.

9. Hyperlipidemia.

10.History of hypothyroidism.

11.Gait dysfunction.

12.Gout history.

13.History of degenerative joint disease.

14.History of back pain.

15.History of nicotine dependence.

16.History of EtOH.



RECOMMENDATIONS AND DISCUSSION:

Recommend to continue current medication, continue to monitor.  Symptomatic treatment.

Otherwise, I would also recommend calcium, vitamin D supplementation.  Otherwise, we

will stop IV fluids.  Otherwise I would also recommend closely follow with surgery.

Further recommendations to follow.





MMODL / IJN: [id number] / Job#: [id number]

## 2019-04-06 NOTE — PN
PROGRESS NOTE

DOS 4/5/19



DATE OF SERVICE:

This 63-year-old gentleman admitted with right ankle fracture also had ORIF.  
The

patient also had sleep apnea.  Patient also had cellulitic lesions.  The patient
was

given a short course of IV Kefzol at this time.



PAST MEDICAL HISTORY:

Reviewed.



REVIEW OF SYSTEMS:

CARDIOVASCULAR:  S1, S2.  Respiratory: As mentioned earlier.  GI no nausea.   
no

dysuria. Central nervous system: No numbness, weakness.



CURRENT MEDICATIONS ARE:

Reviewed and include:

1. Tylenol p.r.n.

2. Norco.

3. Zyloprim.

4. Xanax.

5. Norvasc.

6. Kefzol.

7. Hyzaar.

8. Heparin.

9. Vistaril.

10.Synthroid.

11.Ativan.

12.Morphine.

13.Narcan.

14.Zofran.

15.Protonix.

16.Pravachol.

17.Restoril.





PHYSICAL EXAM:

Patient is alert, oriented x2.  Pulse is 58, blood pressure 130/64, respiration 
is 17,

temperature 98.2, pulse ox 94% on room air.

HEENT: Conjunctivae normal.

NECK: No jugular venous distention.

CARDIOVASCULAR: S1, S2.

RESPIRATORY: Breath sounds diminished in the bases.  A few scattered rhonchi.

ABDOMEN:  Soft, obese.

LEGS:  Status post surgery right leg.

CENTRAL NERVOUS SYSTEM: No focal deficits.



LABS:

Accu-Cheks noted.  WBC 11.8.



ASSESSMENT:

1. Right ankle fracture status post ORIF.

2. Sleep apnea, on CPAP 16.

3. Super morbid obesity with body mass index of 45.6.

4. Vitamin D deficiency.

5. Increased WBC.

6. Hypocalcemia, mild.

7. Right leg swelling, improving.

8. Left foot cellulitis abrasion.

9. Diabetes mellitus type 2.

10.Hypertension.

11.Hyperlipidemia.

12.History of hypothyroidism.

13.History of gait dysfunction.

14.Gout history.

15.History of degenerative joint disease.

16.History of back pain.

17.History of nicotine dependence.

18.History of EtOH.



RECOMMENDATIONS AND DISCUSSION:

Recommend to continue current medications, continue with monitoring, and 
symptomatic

treatment. Otherwise, at this time, also recommend continue the antibiotics and 
subcu

heparin.  Monitor closely.  The patient has some swelling of the left leg.  I

recommended continued follow up with Orthopedic surgery and the patient is 
slated to go

to [facility] rehab.  Medication reconciliation done.  Further recommendations to 
follow.  See

orders for further details.





MMODL / IJN: [id number] / Job#: [id number]

MTDD

## 2019-06-28 ENCOUNTER — HOSPITAL ENCOUNTER (OUTPATIENT)
Dept: HOSPITAL 47 - RADUSWWP | Age: 64
Discharge: HOME | End: 2019-06-28
Attending: ORTHOPAEDIC SURGERY
Payer: MEDICARE

## 2019-06-28 DIAGNOSIS — S82.61XD: ICD-10-CM

## 2019-06-28 DIAGNOSIS — Z48.89: ICD-10-CM

## 2019-06-28 DIAGNOSIS — M17.11: ICD-10-CM

## 2019-06-28 DIAGNOSIS — S82.841D: ICD-10-CM

## 2019-06-28 DIAGNOSIS — L89.509: ICD-10-CM

## 2019-06-28 DIAGNOSIS — I80.9: ICD-10-CM

## 2019-06-28 DIAGNOSIS — S93.04XD: Primary | ICD-10-CM

## 2019-06-28 PROCEDURE — 93970 EXTREMITY STUDY: CPT

## 2019-06-28 NOTE — US
EXAMINATION TYPE: US venous doppler duplex LE BI

 

DATE OF EXAM: 6/28/2019 12:34 PM

 

COMPARISON: NONE

 

CLINICAL HISTORY: 64-year-old male R60.9 EDEMA, M25.571 PAIN IN RT ANKLE.

 

SIDE PERFORMED: Bilateral  

 

TECHNIQUE:  The lower extremity deep venous system is examined utilizing real time linear array sonog
nacho with graded compression, doppler sonography and color-flow sonography.

 

FINDINGS:

 

VESSELS IMAGED:

External Iliac Vein (EIV)

Common Femoral Vein

Deep Femoral Vein

Greater Saphenous Vein *

Femoral Vein

Popliteal Vein

Small Saphenous Vein *

Proximal Calf Veins

(* superficial vessels)

 

 

 

Right Leg:  Negative for DVT

 

Left Leg:  Negative for DVT

 

 

 

IMPRESSION:

No evidence for DVT within the bilateral lower extremities imaged from the groin to the upper calves.

## 2020-06-19 NOTE — ED
Back Pain HPI





- General


Chief Complaint: Back Pain/Injury


Stated Complaint: Knee Pain


Time Seen by Provider: 01/02/18 11:30


Source: patient, EMS, RN notes reviewed


Limitations: no limitations





- History of Present Illness


Initial Comments: 





This is a 62-year-old male who states about 2 weeks ago his dog ran between his 

legs and caused him to slip he fell on a wooden swing which broke up.  She's 

been having some low back and mid back pain since that time but he states now 

has some numbness to his feet and pain radiating down his legs especially on 

the left.  He denies any overt urinary or fecal incontinence any overt loss of 

function to his upper or lower extremities.  He states he does have left knee 

problems and was advised that he may need a knee replacement.  He voices no 

other complaints at this time.  He does state the pain is about 8/10 in 

severity.  Sharp in nature


MD Complaint: back pain, fall





- Related Data


 Home Medications











 Medication  Instructions  Recorded  Confirmed


 


Levothyroxine Sodium [Synthroid] 75 mcg PO DAILY 05/07/14 01/02/18


 


Pravastatin Sodium [Pravachol] 40 mg PO DAILY 05/07/14 01/02/18


 


Allopurinol [Zyloprim] 300 mg PO DAILY 05/12/14 01/02/18


 


Furosemide [Lasix] 40 mg PO DAILY 01/02/18 01/02/18


 


Losartan/Hydrochlorothiazide 1 tab PO DAILY 01/02/18 01/02/18





[Losartan-Hctz 100-25 mg Tab]   


 


Metoprolol Succinate (ER) [Toprol 50 mg PO DAILY 01/02/18 01/02/18





Xl]   


 


amLODIPine [Norvasc] 10 mg PO DAILY 01/02/18 01/02/18








 Previous Rx's











 Medication  Instructions  Recorded


 


Cyclobenzaprine [Flexeril] 10 mg PO TID #14 tab 01/02/18


 


Ibuprofen 800 mg PO Q6HR PRN #20 tablet 01/02/18











 Allergies











Allergy/AdvReac Type Severity Reaction Status Date / Time


 


No Known Allergies Allergy   Verified 05/12/14 16:21














Review of Systems


ROS Statement: 


Those systems with pertinent positive or pertinent negative responses have been 

documented in the HPI.





ROS Other: All systems not noted in ROS Statement are negative.





Past Medical History


Past Medical History: Diabetes Mellitus, Hyperlipidemia, Hypertension, Thyroid 

Disorder


Additional Past Medical History / Comment(s): gout


History of Any Multi-Drug Resistant Organisms: None Reported


Past Surgical History: No Surgical Hx Reported


Additional Past Surgical History / Comment(s): REMOVAL MED CATARACTS


Past Anesthesia/Blood Transfusion Reactions: No Reported Reaction


Past Psychological History: No Psychological Hx Reported


Smoking Status: Never smoker


Past Alcohol Use History: Occasional


Past Drug Use History: None Reported





General Exam





- General Exam Comments


Initial Comments: 





This is a well-developed well-nourished awake alert oriented times 3 male


Limitations: no limitations


General appearance: alert


Head exam: Present: atraumatic, normocephalic, normal inspection


Eye exam: Present: normal appearance, PERRL, EOMI.  Absent: scleral icterus, 

conjunctival injection, periorbital swelling


ENT exam: Present: normal exam, mucous membranes moist


Neck exam: Present: normal inspection.  Absent: tenderness, meningismus, 

lymphadenopathy


Respiratory exam: Present: normal lung sounds bilaterally.  Absent: respiratory 

distress, wheezes, rales, rhonchi, stridor


Cardiovascular Exam: Present: regular rate, normal rhythm, normal heart sounds.

  Absent: systolic murmur, diastolic murmur, rubs, gallop, clicks


GI/Abdominal exam: Present: soft, normal bowel sounds, other (Obese abdomen).  

Absent: distended, tenderness, guarding, rebound, rigid


Rectal exam: Present: deferred


Extremities exam: Present: normal inspection, full ROM, normal capillary refill

, other (Very mild decreased sensation to touch bilaterally).  Absent: 

tenderness, pedal edema, joint swelling, calf tenderness


Back exam: Present: normal inspection, tenderness, paraspinal tenderness, 

vertebral tenderness.  Absent: full ROM, CVA tenderness (R), CVA tenderness (L)

, rash noted (Tennis palpation of the mid to lower thoracic paraspinous muscles 

and spinous processes as well as over the mid to lower lumbar spinous processes 

and paraspinous muscles.  No definite step-off or crepitation.  Tenderness 

palpation over the gluteal muscles is minimal.)


Neurological exam: Present: alert, oriented X3, CN II-XII intact, reflexes 

normal


Psychiatric exam: Present: normal affect, normal mood


Skin exam: Present: warm, dry, intact, normal color.  Absent: rash





Course


 Vital Signs











  01/02/18





  11:06


 


Temperature 98 F


 


Pulse Rate 74


 


Respiratory 16





Rate 


 


Blood Pressure 167/83


 


O2 Sat by Pulse 99





Oximetry 














Medical Decision Making





- Medical Decision Making





I did discuss findings with the patient and family members were present.  

Patient be discharged placed on anti-inflammatories he is referred to 

orthopedics for evaluation of his back he is return when necessary





- Radiology Data


Radiology results: report reviewed (CT was reviewed and results reviewed.  

Thoracic spine reveals degenerative disc disease and spondylosis.  No 

compression fracture noted no central stenosis.  Lumbar spine CT no evidence of 

fracture left paracentral disc herniation at L4 and 5 resulting in central 

stenosis and left lateral recess stenosis.  Also difficulty with borderline to 

mild central stenosis at L3 4.), image reviewed





Disposition


Clinical Impression: 


 Mechanical back pain, Thoracic back pain, Degeneration of intervertebral disc, 

Lumbar radiculopathy





Disposition: HOME SELF-CARE


Condition: Good


Instructions:  Acute Low Back Pain (ED), Degenerative Disc Disease (ED)


Prescriptions: 


Cyclobenzaprine [Flexeril] 10 mg PO TID #14 tab


Ibuprofen 800 mg PO Q6HR PRN #20 tablet


 PRN Reason: Pain


Referrals: 


Graeme Umaña MD [Primary Care Provider] - 1-2 days


UBALDO Soler DO [Doctor of Osteopathic Medicine] - 1-2 days
17

## 2021-11-23 ENCOUNTER — HOSPITAL ENCOUNTER (OUTPATIENT)
Dept: HOSPITAL 47 - RADNMMAIN | Age: 66
Discharge: HOME | End: 2021-11-23
Attending: INTERNAL MEDICINE
Payer: MEDICARE

## 2021-11-23 DIAGNOSIS — R06.00: Primary | ICD-10-CM

## 2021-11-23 PROCEDURE — 71046 X-RAY EXAM CHEST 2 VIEWS: CPT

## 2021-11-23 PROCEDURE — 78452 HT MUSCLE IMAGE SPECT MULT: CPT

## 2021-11-23 PROCEDURE — 93017 CV STRESS TEST TRACING ONLY: CPT

## 2021-11-23 PROCEDURE — 93306 TTE W/DOPPLER COMPLETE: CPT

## 2021-11-23 NOTE — NM
EXAMINATION TYPE: NM stress lexiscan cardiolite

 

DATE OF EXAM: 11/23/2021

 

COMPARISON: Nuclear medicine Cardiolite stress test March 11, 2015

 

HISTORY: History of hypertension and diabetes along with hypercholesteremia and prior tobacco use. Fa
arie history of heart attack.  New Chest pain and shortness of breath.

 

TECHNIQUE:  After the intravenous administration of 10.1 mCi Tc 99m Sestamibi - Cardiolite resting SP
ECT images acquired 45 minutes post injection. 

 

The patient received 0.4mg Lexiscan, 27.5 mCi Tc 99m Sestamibi - Stress images obtained 30 minutes po
st injection 

 

FINDINGS:  

 

Review of stress and rest SPECT images demonstrates scattered areas of diminished perfusion on stress
 images versus rest images for example anteroseptal wall with apical level seen best on short axis an
d vertical long axis images.  There is diminished color intensity involving the anterolateral wall wi
th apical level over a shorter segment on short axis and vertical long axis images. Diminished uptake
 in the inferior wall on both stress and rest images with some decreased uptake are present mid segme
nt. Abnormal end diastolic volume at 185 cc. Gated analysis shows satisfactory estimated left ventric
ular ejection fraction of 56 %.

 

 

 

IMPRESSION: Cannot exclude areas of acute ischemia. Advise direct catheter angiogram follow-up to fur
ther evaluate.

## 2021-11-23 NOTE — XR
EXAMINATION TYPE: XR chest 2V

 

DATE OF EXAM: 11/23/2021

 

COMPARISON: Chest x-ray from 1/20/2019

 

HISTORY: SOB.

 

TECHNIQUE:  Frontal and lateral views of the chest are obtained.

 

FINDINGS:  There is no suspicious new focal air space opacity, pleural effusion, or pneumothorax seen
.  The cardiac silhouette size is stable and enlarged.   The osseous structures are intact.

 

IMPRESSION:  Cardiomegaly without acute pulmonary process. No significant change from prior.

## 2021-11-23 NOTE — P.STRESS
- Stress Test Note


Stress Test Results/Findings: 


Exam Performed: NM stress lexiscan cardiolite


Exam Date: 11/23/21


Reason for Exam: DYSPNEA ON EXERTION





Height: 6 ft 3 in


Weight: 172.7 kg


Protocol: LEXISCAN


Stage: NA


Duration of Exercise: 5 MINUTES





Resting Heart Rate: 63


Resting Blood Pressure: 177/76


Maximum Achieved Heart Rate: 72


Maximum Achieved Blood Pressure: 177/76


85% PMHR: 129


100% PMHR: 152


METS: NA


Technologist Comment: 





Stress Test Results/Findings: 


At baseline EKG showed normal sinus rhythm, normal axis, no significant ST or T-

wave abnormalities.  Patient recieved IV infusion of Lexiscan 0.4mg and at peak 

infusion EKG showed no significant change from baseline.





Conclusions:


1. Normal EKG response to Lexiscan infusion


2. Nuclear imaging to be reported separately.

## 2021-11-23 NOTE — ECHOF
Referral Reason:R06.00 dyspnea on exertion



MEASUREMENTS

--------

HEIGHT: 190.5 cm

WEIGHT: 172.4 kg

BP: 

RVIDd:   3.6 cm     (< 3.3)

IVSd:   1.6 cm     (0.6 - 1.1)

LVIDd:   4.6 cm     (3.9 - 5.3)

LVPWd:   1.3 cm     (0.6 - 1.1)

IVSs:   1.9 cm

LVIDs:   3.4 cm

LVPWs:   2.0 cm

LAESV Index (A-L):   20.20 ml/m

Ao Diam:   4.0 cm     (2.0 - 3.7)

AV Cusp:   2.4 cm     (1.5 - 2.6)

MV EXCURSION:   17.802 mm     (> 18.000)

MV EF SLOPE:   217 mm/s     (70 - 150)

EPSS:   1.1 cm

MV E Ridge:   1.01 m/s

MV DecT:   137 ms

MV A Ridge:   1.12 m/s

MV E/A Ratio:   0.91 

RAP:   5.00 mmHg

RVSP:   55.05 mmHg







FINDINGS

--------

Sinus rhythm.

This was a technically difficult study with suboptimal views.

The left ventricular size is normal.   There is moderate concentric left ventricular hypertrophy.   O
verall left ventricular systolic function is low-normal with, an EF between 50 - 55 %.

The right ventricle is mildly enlarged.

Normal LA  size by volume 22+/-6 ml/m2.

The right atrium was not well visualized.

2.0mg of Lumason was utilized for enhancement of images

Interatrial and interventricular septum intact.

The aortic valve was not well visualized.   There is no evidence of aortic regurgitation.   There is 
no evidence of aortic stenosis.

The mitral valve was not well visualized.   No mitral regurgitation.

Moderate tricuspid regurgitation present.   There is moderate pulmonary hypertension.   The right art
tricular systolic pressure, as measured by Doppler, is 55.05mmHg.

There is no pulmonic regurgitation present.

The aortic root size is normal.

IVC Not well visulized.

There is no pericardial effusion.



CONCLUSIONS

--------

1. The left ventricular size is normal.

2. There is moderate concentric left ventricular hypertrophy.

3. Overall left ventricular systolic function is low-normal with, an EF between 50 - 55 %.

4. The right ventricle is mildly enlarged.

5. Moderate tricuspid regurgitation present.

6. There is moderate pulmonary hypertension.

7. The right ventricular systolic pressure, as measured by Doppler, is 55.05mmHg.





SONOGRAPHER: Lolis Alvarez RDCS

## 2021-11-24 NOTE — EST
Stress Test Results/Findings: 

Exam Performed: NM stress lexiscan cardiolite

Exam Date: 11/23/21

Reason for Exam: DYSPNEA ON EXERTION



Height: 6 ft 3 in

Weight: 172.7 kg

Protocol: LEXISCAN

Stage: NA

Duration of Exercise: 5 MINUTES



Resting Heart Rate: 63

Resting Blood Pressure: 177/76

Maximum Achieved Heart Rate: 72

Maximum Achieved Blood Pressure: 177/76

85% PMHR: 129

100% PMHR: 152

METS: NA

Technologist Comment: 



Stress Test Results/Findings: 

At baseline EKG showed normal sinus rhythm, normal axis, no significant ST or T-
wave abnormalities.  Patient recieved IV infusion of Lexiscan 0.4mg and at peak 
infusion EKG showed no significant change from baseline.



Conclusions:

1. Normal EKG response to Lexiscan infusion

2. Nuclear imaging to be reported separately. 

MTDD

## 2021-12-01 ENCOUNTER — HOSPITAL ENCOUNTER (OUTPATIENT)
Dept: HOSPITAL 47 - LABPAT | Age: 66
Discharge: HOME | End: 2021-12-01
Attending: INTERNAL MEDICINE
Payer: MEDICARE

## 2021-12-01 DIAGNOSIS — Z01.812: Primary | ICD-10-CM

## 2021-12-01 DIAGNOSIS — R93.1: ICD-10-CM

## 2021-12-01 LAB
ANION GAP SERPL CALC-SCNC: 12 MMOL/L
BUN SERPL-SCNC: 23 MG/DL (ref 9–20)
CHLORIDE SERPL-SCNC: 114 MMOL/L (ref 98–107)
CO2 SERPL-SCNC: 17 MMOL/L (ref 22–30)
ERYTHROCYTE [DISTWIDTH] IN BLOOD BY AUTOMATED COUNT: 4.41 M/UL (ref 4.3–5.9)
ERYTHROCYTE [DISTWIDTH] IN BLOOD: 14.5 % (ref 11.5–15.5)
HCT VFR BLD AUTO: 41.3 % (ref 39–53)
HGB BLD-MCNC: 14.7 GM/DL (ref 13–17.5)
MCH RBC QN AUTO: 33.4 PG (ref 25–35)
MCHC RBC AUTO-ENTMCNC: 35.7 G/DL (ref 31–37)
MCV RBC AUTO: 93.6 FL (ref 80–100)
PLATELET # BLD AUTO: 209 K/UL (ref 150–450)
POTASSIUM SERPL-SCNC: 4.3 MMOL/L (ref 3.5–5.1)
SODIUM SERPL-SCNC: 143 MMOL/L (ref 137–145)
WBC # BLD AUTO: 9.2 K/UL (ref 3.8–10.6)

## 2021-12-01 PROCEDURE — 80051 ELECTROLYTE PANEL: CPT

## 2021-12-01 PROCEDURE — 82565 ASSAY OF CREATININE: CPT

## 2021-12-01 PROCEDURE — 84520 ASSAY OF UREA NITROGEN: CPT

## 2021-12-01 PROCEDURE — 85027 COMPLETE CBC AUTOMATED: CPT

## 2021-12-07 ENCOUNTER — HOSPITAL ENCOUNTER (OUTPATIENT)
Dept: HOSPITAL 47 - CATHCVL | Age: 66
Discharge: HOME | End: 2021-12-07
Attending: INTERNAL MEDICINE
Payer: MEDICARE

## 2021-12-07 VITALS — TEMPERATURE: 97.8 F

## 2021-12-07 VITALS — DIASTOLIC BLOOD PRESSURE: 72 MMHG | HEART RATE: 74 BPM | SYSTOLIC BLOOD PRESSURE: 168 MMHG

## 2021-12-07 VITALS — BODY MASS INDEX: 47.8 KG/M2

## 2021-12-07 VITALS — RESPIRATION RATE: 18 BRPM

## 2021-12-07 DIAGNOSIS — E78.2: ICD-10-CM

## 2021-12-07 DIAGNOSIS — E66.01: ICD-10-CM

## 2021-12-07 DIAGNOSIS — I25.84: ICD-10-CM

## 2021-12-07 DIAGNOSIS — I25.10: Primary | ICD-10-CM

## 2021-12-07 DIAGNOSIS — Z98.49: ICD-10-CM

## 2021-12-07 DIAGNOSIS — Z87.891: ICD-10-CM

## 2021-12-07 DIAGNOSIS — I10: ICD-10-CM

## 2021-12-07 DIAGNOSIS — Z82.49: ICD-10-CM

## 2021-12-07 DIAGNOSIS — R94.39: ICD-10-CM

## 2021-12-07 DIAGNOSIS — Z79.899: ICD-10-CM

## 2021-12-07 DIAGNOSIS — Z20.822: ICD-10-CM

## 2021-12-07 DIAGNOSIS — M19.90: ICD-10-CM

## 2021-12-07 PROCEDURE — 87635 SARS-COV-2 COVID-19 AMP PRB: CPT

## 2021-12-07 PROCEDURE — 93458 L HRT ARTERY/VENTRICLE ANGIO: CPT

## 2021-12-07 NOTE — CC
CARDIAC CATHETERIZATION REPORT



INDICATION:

Abnormal stress test showing ischemia in LAD distribution.



PROCEDURE NOTE:

After obtaining informed consent, left heart catheterization and coronary angiogram are

performed via the right radial artery using size 4 Montserrat catheters.  The patient

tolerated the procedure well without any obvious immediate complications.  Patient

received moderate conscious sedation.



Total sedation time was 16 minutes.  At the end of the procedure, a TR band was used

for hemostasis using standard protocol.  97 percent pulse ox has been documented.



The radial artery access was obtained using the modified Seldinger technique and a

Glidewire was use to manipulate the catheters into the ascending aorta and catheters

were exchanged in the ascending aorta under fluoroscopic guidance.  The arteries are

calcified as is the aorta.  The patient received 5 mg of intravenous verapamil and 5000

units of IV heparin to prevent vasospasm and coagulation.



FINDINGS:



HEMODYNAMICS:

Left ventricular end-diastolic pressure is 18 mm.  There is no significant gradient

across the aortic valve.



LEFT VENTRICULOGRAM:

Left ventriculogram is not performed.



ANGIOGRAPHIC DATA:

LEFT MAIN CORONARY ARTERY: Left main coronary artery is a normal-sized vessel and is

free of stenosis.  Divides into left anterior descending coronary artery and circumflex

coronary artery.

LEFT ANTERIOR DESCENDING CORONARY ARTERY: LAD appears calcified and shows mild

nonobstructive disease involving proximal and mid LAD. CIRCUMFLEX CORONARY ARTERY:

Circumflex coronary artery is a nondominant vessel and is free of significant stenosis.

LAD shows a moderate atherosclerotic plaque in its midportion. At its worst, it seems

to be a 40-50 percent stenosis.

RIGHT CORONARY ARTERY: Right coronary artery is a large dominant vessel and is free of

significant stenosis.



CONCLUSIONS:

Heavily calcified vessel with a moderate area of atherosclerotic plaque in the

midportion seems to be a 40-50 percent stenosis.  The patient will be managed with

optimal medical therapy and consider IVUS of the mid LAD lesion if he develops symptoms

in spite of medical therapy.  The patient is currently on statins, ACE inhibitors and

beta blockers.  I will add nitrates.  He has elevated creatinine.  His contrast

threshold is 126 mL.  I have used 60 mL of contrast agent.





MMODL / IJN: [id number] / Job#: [id number] Who is calling:  Patient    Reason for Call:  Patient returning Romi's call regarding INR results.  Please call patient with results for dosing.    Date of last appointment with primary care: 06/22/20    Okay to leave a detailed message: Yes

## 2021-12-07 NOTE — LTR
DATE OF SERVICE:

12/07/2021







Dear Dr. Santana:



I performed cardiac catheterization on Rodger Tristan. A detailed catheterization note

is enclosed for your records.



In brief, the patient was referred to me because of an abnormal stress test and on the

cardiac catheterization I performed, he has moderate atherosclerotic plaque in the mid

LAD region which we are going to manage with optimal medical therapy at this time.  If

symptoms persist, we might consider an IVUS of the lesion and if necessary

revascularize the vessel.  The patient will be treated with optimal medical therapy and

risk factor modification in the meantime.



Thank you for giving me the privilege of participating in the care of this pleasant

gentleman.



Sincerely,





LITZY / AYANAN: [id number] / Job#: [id number]

## 2022-04-12 ENCOUNTER — HOSPITAL ENCOUNTER (OUTPATIENT)
Dept: HOSPITAL 47 - CPPFTMAIN | Age: 67
End: 2022-04-12
Attending: INTERNAL MEDICINE
Payer: MEDICARE

## 2022-04-12 DIAGNOSIS — F17.200: ICD-10-CM

## 2022-04-12 DIAGNOSIS — R06.00: Primary | ICD-10-CM

## 2022-04-12 PROCEDURE — 94060 EVALUATION OF WHEEZING: CPT

## 2022-04-12 PROCEDURE — 94729 DIFFUSING CAPACITY: CPT

## 2022-12-20 ENCOUNTER — HOSPITAL ENCOUNTER (OUTPATIENT)
Dept: HOSPITAL 47 - CPPFTMAIN | Age: 67
End: 2022-12-20
Attending: INTERNAL MEDICINE
Payer: MEDICARE

## 2022-12-20 DIAGNOSIS — Z87.891: ICD-10-CM

## 2022-12-20 DIAGNOSIS — R06.00: Primary | ICD-10-CM

## 2022-12-20 PROCEDURE — 94726 PLETHYSMOGRAPHY LUNG VOLUMES: CPT

## 2022-12-20 PROCEDURE — 94729 DIFFUSING CAPACITY: CPT

## 2022-12-20 PROCEDURE — 94060 EVALUATION OF WHEEZING: CPT

## 2024-11-25 ENCOUNTER — HOSPITAL ENCOUNTER (EMERGENCY)
Dept: HOSPITAL 47 - EC | Age: 69
Discharge: OTHER INSTITUTION | End: 2024-11-25
Payer: MEDICARE

## 2024-11-25 VITALS — DIASTOLIC BLOOD PRESSURE: 62 MMHG | TEMPERATURE: 93.8 F | HEART RATE: 58 BPM | SYSTOLIC BLOOD PRESSURE: 136 MMHG

## 2024-11-25 VITALS — RESPIRATION RATE: 16 BRPM

## 2024-11-25 DIAGNOSIS — E66.9: ICD-10-CM

## 2024-11-25 DIAGNOSIS — E87.5: ICD-10-CM

## 2024-11-25 DIAGNOSIS — E11.9: ICD-10-CM

## 2024-11-25 DIAGNOSIS — I50.9: ICD-10-CM

## 2024-11-25 DIAGNOSIS — E78.5: ICD-10-CM

## 2024-11-25 DIAGNOSIS — K92.2: Primary | ICD-10-CM

## 2024-11-25 DIAGNOSIS — I11.0: ICD-10-CM

## 2024-11-25 DIAGNOSIS — Z79.899: ICD-10-CM

## 2024-11-25 DIAGNOSIS — N17.9: ICD-10-CM

## 2024-11-25 DIAGNOSIS — D64.9: ICD-10-CM

## 2024-11-25 LAB
ALBUMIN SERPL-MCNC: 3 G/DL (ref 3.5–5)
ALP SERPL-CCNC: 84 U/L (ref 38–126)
ALT SERPL-CCNC: 8 U/L (ref 4–49)
ANION GAP SERPL CALC-SCNC: 12 MMOL/L
APTT BLD: 30 SEC (ref 22–30)
AST SERPL-CCNC: 12 U/L (ref 17–59)
BASOPHILS # BLD AUTO: 0 K/UL (ref 0–0.2)
BASOPHILS NFR BLD AUTO: 1 %
BUN SERPL-SCNC: 76 MG/DL (ref 9–20)
CALCIUM SPEC-MCNC: 8.3 MG/DL (ref 8.4–10.2)
CHLORIDE SERPL-SCNC: 122 MMOL/L (ref 98–107)
CO2 SERPL-SCNC: 10 MMOL/L (ref 22–30)
EOSINOPHIL # BLD AUTO: 0.3 K/UL (ref 0–0.7)
EOSINOPHIL NFR BLD AUTO: 5 %
ERYTHROCYTE [DISTWIDTH] IN BLOOD BY AUTOMATED COUNT: 2.06 M/UL (ref 4.3–5.9)
ERYTHROCYTE [DISTWIDTH] IN BLOOD: 16.5 % (ref 11.5–15.5)
GLUCOSE SERPL-MCNC: 74 MG/DL (ref 74–99)
HCT VFR BLD AUTO: 20.9 % (ref 39–53)
HGB BLD-MCNC: 6.6 GM/DL (ref 13–17.5)
INR PPP: 1 (ref ?–1.2)
LYMPHOCYTES # SPEC AUTO: 0.7 K/UL (ref 1–4.8)
LYMPHOCYTES NFR SPEC AUTO: 16 %
MAGNESIUM SPEC-SCNC: 2 MG/DL (ref 1.6–2.3)
MCH RBC QN AUTO: 32.1 PG (ref 25–35)
MCHC RBC AUTO-ENTMCNC: 31.7 G/DL (ref 31–37)
MCV RBC AUTO: 101.4 FL (ref 80–100)
MONOCYTES # BLD AUTO: 0.3 K/UL (ref 0–1)
MONOCYTES NFR BLD AUTO: 8 %
NEUTROPHILS # BLD AUTO: 3.2 K/UL (ref 1.3–7.7)
NEUTROPHILS NFR BLD AUTO: 69 %
NT-PROBNP SERPL-MCNC: 5600 PG/ML
PH UR: 5.5 [PH] (ref 5–8)
PLATELET # BLD AUTO: 85 K/UL (ref 150–450)
POTASSIUM SERPL-SCNC: 5.9 MMOL/L (ref 3.5–5.1)
PROT SERPL-MCNC: 5.2 G/DL (ref 6.3–8.2)
PROT UR QL: (no result)
PT BLD: 10.7 SEC (ref 10–12.5)
RBC UR QL: 1 /HPF (ref 0–5)
SODIUM SERPL-SCNC: 144 MMOL/L (ref 137–145)
SP GR UR: 1.01 (ref 1–1.03)
T4 FREE SERPL-MCNC: 1.35 NG/DL (ref 0.78–2.19)
UROBILINOGEN UR QL STRIP: <2 MG/DL (ref ?–2)
WBC # BLD AUTO: 4.6 K/UL (ref 3.8–10.6)
WBC # UR AUTO: 1 /HPF (ref 0–5)

## 2024-11-25 PROCEDURE — 82272 OCCULT BLD FECES 1-3 TESTS: CPT

## 2024-11-25 PROCEDURE — 86920 COMPATIBILITY TEST SPIN: CPT

## 2024-11-25 PROCEDURE — 71046 X-RAY EXAM CHEST 2 VIEWS: CPT

## 2024-11-25 PROCEDURE — 51798 US URINE CAPACITY MEASURE: CPT

## 2024-11-25 PROCEDURE — 36430 TRANSFUSION BLD/BLD COMPNT: CPT

## 2024-11-25 PROCEDURE — 84484 ASSAY OF TROPONIN QUANT: CPT

## 2024-11-25 PROCEDURE — 80053 COMPREHEN METABOLIC PANEL: CPT

## 2024-11-25 PROCEDURE — 86900 BLOOD TYPING SEROLOGIC ABO: CPT

## 2024-11-25 PROCEDURE — 84443 ASSAY THYROID STIM HORMONE: CPT

## 2024-11-25 PROCEDURE — 86850 RBC ANTIBODY SCREEN: CPT

## 2024-11-25 PROCEDURE — 84439 ASSAY OF FREE THYROXINE: CPT

## 2024-11-25 PROCEDURE — 83880 ASSAY OF NATRIURETIC PEPTIDE: CPT

## 2024-11-25 PROCEDURE — 99291 CRITICAL CARE FIRST HOUR: CPT

## 2024-11-25 PROCEDURE — 86901 BLOOD TYPING SEROLOGIC RH(D): CPT

## 2024-11-25 PROCEDURE — 85610 PROTHROMBIN TIME: CPT

## 2024-11-25 PROCEDURE — 83735 ASSAY OF MAGNESIUM: CPT

## 2024-11-25 PROCEDURE — 85025 COMPLETE CBC W/AUTO DIFF WBC: CPT

## 2024-11-25 PROCEDURE — 85730 THROMBOPLASTIN TIME PARTIAL: CPT

## 2024-11-25 PROCEDURE — 85379 FIBRIN DEGRADATION QUANT: CPT

## 2024-11-25 PROCEDURE — 96374 THER/PROPH/DIAG INJ IV PUSH: CPT

## 2024-11-25 PROCEDURE — 36415 COLL VENOUS BLD VENIPUNCTURE: CPT

## 2024-11-25 PROCEDURE — 82330 ASSAY OF CALCIUM: CPT

## 2024-11-25 PROCEDURE — 81001 URINALYSIS AUTO W/SCOPE: CPT

## 2024-11-25 PROCEDURE — 96375 TX/PRO/DX INJ NEW DRUG ADDON: CPT

## 2024-11-25 PROCEDURE — 93005 ELECTROCARDIOGRAM TRACING: CPT

## 2024-11-25 RX ADMIN — PANTOPRAZOLE SODIUM ONE MG: 40 INJECTION, POWDER, FOR SOLUTION INTRAVENOUS at 04:28

## 2024-11-25 RX ADMIN — INSULIN HUMAN ONE UNIT: 100 INJECTION, SOLUTION PARENTERAL at 05:31

## 2024-11-25 RX ADMIN — FUROSEMIDE ONE MG: 10 INJECTION, SOLUTION INTRAMUSCULAR; INTRAVENOUS at 04:30

## 2024-11-25 RX ADMIN — ACETAMINOPHEN STA MG: 325 TABLET, FILM COATED ORAL at 03:20

## 2024-11-25 RX ADMIN — CALCIUM GLUCONATE ONE MLS/HR: 10 INJECTION, SOLUTION INTRAVENOUS at 04:32

## 2024-11-25 RX ADMIN — DEXTROSE MONOHYDRATE ONE ML: 25 INJECTION, SOLUTION INTRAVENOUS at 05:30
